# Patient Record
Sex: FEMALE | Race: WHITE | NOT HISPANIC OR LATINO
[De-identification: names, ages, dates, MRNs, and addresses within clinical notes are randomized per-mention and may not be internally consistent; named-entity substitution may affect disease eponyms.]

---

## 2020-09-23 PROBLEM — Z00.00 ENCOUNTER FOR PREVENTIVE HEALTH EXAMINATION: Status: ACTIVE | Noted: 2020-09-23

## 2020-09-24 ENCOUNTER — APPOINTMENT (OUTPATIENT)
Dept: NEUROSURGERY | Facility: CLINIC | Age: 75
End: 2020-09-24

## 2020-10-08 ENCOUNTER — APPOINTMENT (OUTPATIENT)
Dept: NEUROSURGERY | Facility: CLINIC | Age: 75
End: 2020-10-08

## 2020-10-29 ENCOUNTER — APPOINTMENT (OUTPATIENT)
Dept: NEUROSURGERY | Facility: CLINIC | Age: 75
End: 2020-10-29
Payer: MEDICARE

## 2020-10-29 VITALS
HEIGHT: 62 IN | WEIGHT: 132 LBS | DIASTOLIC BLOOD PRESSURE: 77 MMHG | BODY MASS INDEX: 24.29 KG/M2 | RESPIRATION RATE: 16 BRPM | HEART RATE: 91 BPM | TEMPERATURE: 97.6 F | SYSTOLIC BLOOD PRESSURE: 167 MMHG | OXYGEN SATURATION: 99 %

## 2020-10-29 DIAGNOSIS — Z82.49 FAMILY HISTORY OF ISCHEMIC HEART DISEASE AND OTHER DISEASES OF THE CIRCULATORY SYSTEM: ICD-10-CM

## 2020-10-29 DIAGNOSIS — Z82.3 FAMILY HISTORY OF STROKE: ICD-10-CM

## 2020-10-29 DIAGNOSIS — Z87.09 PERSONAL HISTORY OF OTHER DISEASES OF THE RESPIRATORY SYSTEM: ICD-10-CM

## 2020-10-29 DIAGNOSIS — Z87.39 PERSONAL HISTORY OF OTHER DISEASES OF THE MUSCULOSKELETAL SYSTEM AND CONNECTIVE TISSUE: ICD-10-CM

## 2020-10-29 DIAGNOSIS — I49.3 VENTRICULAR PREMATURE DEPOLARIZATION: ICD-10-CM

## 2020-10-29 DIAGNOSIS — Z87.19 PERSONAL HISTORY OF OTHER DISEASES OF THE DIGESTIVE SYSTEM: ICD-10-CM

## 2020-10-29 DIAGNOSIS — Z87.891 PERSONAL HISTORY OF NICOTINE DEPENDENCE: ICD-10-CM

## 2020-10-29 DIAGNOSIS — Z78.9 OTHER SPECIFIED HEALTH STATUS: ICD-10-CM

## 2020-10-29 DIAGNOSIS — Z86.79 PERSONAL HISTORY OF OTHER DISEASES OF THE CIRCULATORY SYSTEM: ICD-10-CM

## 2020-10-29 PROCEDURE — 99203 OFFICE O/P NEW LOW 30 MIN: CPT

## 2020-11-02 NOTE — PHYSICAL EXAM
[General Appearance - Alert] : alert [General Appearance - In No Acute Distress] : in no acute distress [General Appearance - Well Nourished] : well nourished [General Appearance - Well Developed] : well developed [General Appearance - Well-Appearing] : healthy appearing [Oriented To Time, Place, And Person] : oriented to person, place, and time [Impaired Insight] : insight and judgment were intact [Affect] : the affect was normal [Memory Recent] : recent memory was not impaired [Person] : oriented to person [Place] : oriented to place [Time] : oriented to time [Cranial Nerves Optic (II)] : visual acuity intact bilaterally,  pupils equal round and reactive to light [Cranial Nerves Oculomotor (III)] : extraocular motion intact [Cranial Nerves Trigeminal (V)] : facial sensation intact symmetrically [Cranial Nerves Facial (VII)] : face symmetrical [Cranial Nerves Vestibulocochlear (VIII)] : hearing was intact bilaterally [Cranial Nerves Glossopharyngeal (IX)] : tongue and palate midline [Cranial Nerves Accessory (XI - Cranial And Spinal)] : head turning and shoulder shrug symmetric [Cranial Nerves Hypoglossal (XII)] : there was no tongue deviation with protrusion [Motor Handedness Right-Handed] : the patient is right hand dominant [Sclera] : the sclera and conjunctiva were normal [PERRL With Normal Accommodation] : pupils were equal in size, round, reactive to light, with normal accommodation [Extraocular Movements] : extraocular movements were intact [Outer Ear] : the ears and nose were normal in appearance [Hearing Threshold Finger Rub Not [place]] : hearing was normal [Oropharynx] : the oropharynx was normal [Neck Appearance] : the appearance of the neck was normal [Respiration, Rhythm And Depth] : normal respiratory rhythm and effort [Exaggerated Use Of Accessory Muscles For Inspiration] : no accessory muscle use [Heart Rate And Rhythm] : heart rate was normal and rhythm regular [Abnormal Walk] : normal gait [Involuntary Movements] : no involuntary movements were seen [Motor Tone] : muscle strength and tone were normal [Skin Color & Pigmentation] : normal skin color and pigmentation [] : no rash [FreeTextEntry8] : slow steady gait [FreeTextEntry1] : wears glasses

## 2020-11-02 NOTE — PHYSICAL EXAM
[General Appearance - Alert] : alert [General Appearance - In No Acute Distress] : in no acute distress [General Appearance - Well Nourished] : well nourished [General Appearance - Well Developed] : well developed [General Appearance - Well-Appearing] : healthy appearing [Oriented To Time, Place, And Person] : oriented to person, place, and time [Impaired Insight] : insight and judgment were intact [Affect] : the affect was normal [Memory Recent] : recent memory was not impaired [Person] : oriented to person [Place] : oriented to place [Time] : oriented to time [Cranial Nerves Optic (II)] : visual acuity intact bilaterally,  pupils equal round and reactive to light [Cranial Nerves Oculomotor (III)] : extraocular motion intact [Cranial Nerves Trigeminal (V)] : facial sensation intact symmetrically [Cranial Nerves Facial (VII)] : face symmetrical [Cranial Nerves Vestibulocochlear (VIII)] : hearing was intact bilaterally [Cranial Nerves Glossopharyngeal (IX)] : tongue and palate midline [Cranial Nerves Accessory (XI - Cranial And Spinal)] : head turning and shoulder shrug symmetric [Cranial Nerves Hypoglossal (XII)] : there was no tongue deviation with protrusion [Motor Handedness Right-Handed] : the patient is right hand dominant [Sclera] : the sclera and conjunctiva were normal [PERRL With Normal Accommodation] : pupils were equal in size, round, reactive to light, with normal accommodation [Extraocular Movements] : extraocular movements were intact [Outer Ear] : the ears and nose were normal in appearance [Hearing Threshold Finger Rub Not [place]] : hearing was normal [Oropharynx] : the oropharynx was normal [Neck Appearance] : the appearance of the neck was normal [Respiration, Rhythm And Depth] : normal respiratory rhythm and effort [Exaggerated Use Of Accessory Muscles For Inspiration] : no accessory muscle use [Heart Rate And Rhythm] : heart rate was normal and rhythm regular [Abnormal Walk] : normal gait [Involuntary Movements] : no involuntary movements were seen [Motor Tone] : muscle strength and tone were normal [Skin Color & Pigmentation] : normal skin color and pigmentation [] : no rash [FreeTextEntry8] : slow steady gait [FreeTextEntry1] : wears glasses

## 2020-11-02 NOTE — HISTORY OF PRESENT ILLNESS
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  She also thinks that now she has a shuffling gait and this is new.\par \par She is a former smoker - quit 27 years ago.  \par \par Dr. Christianne Tyson ([facility]), [address] [place] [phone]. \par .

## 2020-11-02 NOTE — HISTORY OF PRESENT ILLNESS
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  She also thinks that now she has a shuffling gait and this is new.\par \par She is a former smoker - quit 27 years ago.  \par \par Dr. Christianne Tyson ([facility]), [address] [place] [phone]. \par .

## 2020-11-02 NOTE — REVIEW OF SYSTEMS
[Poor Coordination] : poor coordination [Dizziness] : dizziness [Difficulty Walking] : difficulty walking [Constipation] : constipation [Negative] : Endocrine [FreeTextEntry3] : wears glasses [FreeTextEntry7] : nausea [FreeTextEntry9] : arthritis

## 2020-11-13 ENCOUNTER — TRANSCRIPTION ENCOUNTER (OUTPATIENT)
Age: 75
End: 2020-11-13

## 2022-10-18 ENCOUNTER — OUTPATIENT (OUTPATIENT)
Dept: OUTPATIENT SERVICES | Facility: HOSPITAL | Age: 77
LOS: 1 days | End: 2022-10-18
Payer: MEDICARE

## 2022-10-18 ENCOUNTER — APPOINTMENT (OUTPATIENT)
Dept: MRI IMAGING | Facility: CLINIC | Age: 77
End: 2022-10-18

## 2022-10-18 DIAGNOSIS — I67.1 CEREBRAL ANEURYSM, NONRUPTURED: ICD-10-CM

## 2022-10-18 PROCEDURE — 70546 MR ANGIOGRAPH HEAD W/O&W/DYE: CPT | Mod: 26,MH

## 2022-10-18 PROCEDURE — A9585: CPT

## 2022-10-18 PROCEDURE — 70546 MR ANGIOGRAPH HEAD W/O&W/DYE: CPT

## 2022-11-07 ENCOUNTER — NON-APPOINTMENT (OUTPATIENT)
Age: 77
End: 2022-11-07

## 2022-11-15 ENCOUNTER — APPOINTMENT (OUTPATIENT)
Dept: NEUROSURGERY | Facility: CLINIC | Age: 77
End: 2022-11-15

## 2022-11-15 VITALS
SYSTOLIC BLOOD PRESSURE: 148 MMHG | WEIGHT: 126 LBS | DIASTOLIC BLOOD PRESSURE: 83 MMHG | OXYGEN SATURATION: 97 % | HEART RATE: 74 BPM | HEIGHT: 62 IN | BODY MASS INDEX: 23.19 KG/M2

## 2022-11-15 DIAGNOSIS — I67.1 CEREBRAL ANEURYSM, NONRUPTURED: ICD-10-CM

## 2022-11-15 PROCEDURE — 99213 OFFICE O/P EST LOW 20 MIN: CPT

## 2022-11-15 RX ORDER — MULTIVITAMIN
TABLET ORAL
Refills: 0 | Status: ACTIVE | COMMUNITY

## 2022-11-15 RX ORDER — FLUTICASONE FUROATE, UMECLIDINIUM BROMIDE AND VILANTEROL TRIFENATATE 100; 62.5; 25 UG/1; UG/1; UG/1
100-62.5-25 POWDER RESPIRATORY (INHALATION)
Qty: 60 | Refills: 0 | Status: ACTIVE | COMMUNITY
Start: 2022-06-11

## 2022-11-15 RX ORDER — LORATADINE 10 MG/1
10 TABLET ORAL
Refills: 0 | Status: ACTIVE | COMMUNITY

## 2022-11-15 RX ORDER — ATORVASTATIN CALCIUM 40 MG/1
40 TABLET, FILM COATED ORAL
Qty: 30 | Refills: 0 | Status: ACTIVE | COMMUNITY
Start: 2022-07-14

## 2022-11-15 RX ORDER — CEPHALEXIN 500 MG/1
500 CAPSULE ORAL
Qty: 21 | Refills: 0 | Status: DISCONTINUED | COMMUNITY
Start: 2022-09-08

## 2022-11-15 RX ORDER — SUCRALFATE 1 G/1
1 TABLET ORAL
Qty: 360 | Refills: 0 | Status: ACTIVE | COMMUNITY
Start: 2022-11-09

## 2022-11-15 RX ORDER — CLOPIDOGREL BISULFATE 75 MG/1
75 TABLET, FILM COATED ORAL
Qty: 90 | Refills: 0 | Status: ACTIVE | COMMUNITY
Start: 2022-07-05

## 2022-11-15 RX ORDER — AZELASTINE HYDROCHLORIDE 137 UG/1
0.1 SPRAY, METERED NASAL
Qty: 30 | Refills: 0 | Status: ACTIVE | COMMUNITY
Start: 2022-05-16

## 2022-11-15 RX ORDER — DONEPEZIL HYDROCHLORIDE 10 MG/1
10 TABLET ORAL
Qty: 30 | Refills: 0 | Status: ACTIVE | COMMUNITY
Start: 2022-07-14

## 2022-11-15 RX ORDER — AMLODIPINE BESYLATE 5 MG/1
5 TABLET ORAL
Refills: 0 | Status: ACTIVE | COMMUNITY

## 2022-11-15 RX ORDER — PANTOPRAZOLE 20 MG/1
20 TABLET, DELAYED RELEASE ORAL
Qty: 90 | Refills: 0 | Status: ACTIVE | COMMUNITY
Start: 2022-07-14

## 2022-11-21 NOTE — HISTORY OF PRESENT ILLNESS
[FreeTextEntry1] : Shelley Enamorado is a pleasant 77 year old right handed female who presents today for follow up and MRA review.  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  \par \par She is a former smoker - quit 27 years ago.  \par \par She had 2 cardiac stents in 3/2022, on ASA and Plavix. \par \par Dr. Christianne Tyson ([facility]), [address] [place] [phone]. \par .

## 2022-11-21 NOTE — PHYSICAL EXAM
[General Appearance - Alert] : alert [General Appearance - In No Acute Distress] : in no acute distress [General Appearance - Well Nourished] : well nourished [General Appearance - Well Developed] : well developed [General Appearance - Well-Appearing] : healthy appearing [Oriented To Time, Place, And Person] : oriented to person, place, and time [Impaired Insight] : insight and judgment were intact [Affect] : the affect was normal [Memory Recent] : recent memory was not impaired [Person] : oriented to person [Place] : oriented to place [Time] : oriented to time [Cranial Nerves Optic (II)] : visual acuity intact bilaterally,  pupils equal round and reactive to light [Cranial Nerves Oculomotor (III)] : extraocular motion intact [Cranial Nerves Trigeminal (V)] : facial sensation intact symmetrically [Cranial Nerves Facial (VII)] : face symmetrical [Cranial Nerves Vestibulocochlear (VIII)] : hearing was intact bilaterally [Cranial Nerves Glossopharyngeal (IX)] : tongue and palate midline [Cranial Nerves Accessory (XI - Cranial And Spinal)] : head turning and shoulder shrug symmetric [Cranial Nerves Hypoglossal (XII)] : there was no tongue deviation with protrusion [Motor Handedness Right-Handed] : the patient is right hand dominant [Sclera] : the sclera and conjunctiva were normal [PERRL With Normal Accommodation] : pupils were equal in size, round, reactive to light, with normal accommodation [Extraocular Movements] : extraocular movements were intact [Outer Ear] : the ears and nose were normal in appearance [Hearing Threshold Finger Rub Not [place]] : hearing was normal [Oropharynx] : the oropharynx was normal [Neck Appearance] : the appearance of the neck was normal [Respiration, Rhythm And Depth] : normal respiratory rhythm and effort [Exaggerated Use Of Accessory Muscles For Inspiration] : no accessory muscle use [Heart Rate And Rhythm] : heart rate was normal and rhythm regular [Abnormal Walk] : normal gait [Involuntary Movements] : no involuntary movements were seen [Motor Tone] : muscle strength and tone were normal [Skin Color & Pigmentation] : normal skin color and pigmentation [] : no rash [FreeTextEntry8] : slow steady gait [FreeTextEntry1] : wears glasses

## 2022-11-21 NOTE — ASSESSMENT
[FreeTextEntry1] : IMPRESSION:\par s/p coil embolization of left ICA aneurysm in 2/2010\par Feeling Well Today Without Complaints\par \par MRA Head 10/2022 reveals complete occlusion of the previously treated left ICA aneurysm; unchanged from prior in 2020\par \par Will continue routine follow up with another MRA in 2 years. \par \par PLAN:\par MRA Head wo in 11/2024\par Follow Up with Me After Imaging\par

## 2023-01-26 ENCOUNTER — OUTPATIENT (OUTPATIENT)
Dept: OUTPATIENT SERVICES | Facility: HOSPITAL | Age: 78
LOS: 1 days | Discharge: HOME | End: 2023-01-26
Payer: MEDICARE

## 2023-01-26 ENCOUNTER — TRANSCRIPTION ENCOUNTER (OUTPATIENT)
Age: 78
End: 2023-01-26

## 2023-01-26 VITALS
HEART RATE: 74 BPM | OXYGEN SATURATION: 98 % | DIASTOLIC BLOOD PRESSURE: 64 MMHG | RESPIRATION RATE: 17 BRPM | SYSTOLIC BLOOD PRESSURE: 138 MMHG

## 2023-01-26 VITALS
DIASTOLIC BLOOD PRESSURE: 78 MMHG | HEART RATE: 80 BPM | TEMPERATURE: 98 F | SYSTOLIC BLOOD PRESSURE: 142 MMHG | WEIGHT: 126.1 LBS | HEIGHT: 62 IN | RESPIRATION RATE: 17 BRPM | OXYGEN SATURATION: 99 %

## 2023-01-26 DIAGNOSIS — Z98.890 OTHER SPECIFIED POSTPROCEDURAL STATES: Chronic | ICD-10-CM

## 2023-01-26 DIAGNOSIS — I67.1 CEREBRAL ANEURYSM, NONRUPTURED: Chronic | ICD-10-CM

## 2023-01-26 DIAGNOSIS — K21.9 GASTRO-ESOPHAGEAL REFLUX DISEASE WITHOUT ESOPHAGITIS: ICD-10-CM

## 2023-01-26 DIAGNOSIS — R11.0 NAUSEA: ICD-10-CM

## 2023-01-26 DIAGNOSIS — Z90.710 ACQUIRED ABSENCE OF BOTH CERVIX AND UTERUS: Chronic | ICD-10-CM

## 2023-01-26 PROCEDURE — 88305 TISSUE EXAM BY PATHOLOGIST: CPT | Mod: 26

## 2023-01-26 PROCEDURE — 88312 SPECIAL STAINS GROUP 1: CPT | Mod: 26

## 2023-01-26 RX ORDER — SODIUM CHLORIDE 9 MG/ML
1000 INJECTION, SOLUTION INTRAVENOUS
Refills: 0 | Status: DISCONTINUED | OUTPATIENT
Start: 2023-01-26 | End: 2023-01-26

## 2023-01-26 RX ORDER — LORATADINE 10 MG/1
2 TABLET ORAL
Qty: 0 | Refills: 0 | DISCHARGE

## 2023-01-26 RX ORDER — SUCRALFATE 1 G
1 TABLET ORAL
Qty: 0 | Refills: 0 | DISCHARGE

## 2023-01-26 RX ORDER — DONEPEZIL HYDROCHLORIDE 10 MG/1
1 TABLET, FILM COATED ORAL
Qty: 0 | Refills: 0 | DISCHARGE

## 2023-01-26 RX ORDER — CLOPIDOGREL BISULFATE 75 MG/1
1 TABLET, FILM COATED ORAL
Qty: 0 | Refills: 0 | DISCHARGE

## 2023-01-26 RX ORDER — MONTELUKAST 4 MG/1
1 TABLET, CHEWABLE ORAL
Qty: 0 | Refills: 0 | DISCHARGE

## 2023-01-26 RX ORDER — AMLODIPINE BESYLATE 2.5 MG/1
1 TABLET ORAL
Qty: 0 | Refills: 0 | DISCHARGE

## 2023-01-26 RX ORDER — AZELASTINE HYDROCHLORIDE AND FLUTICASONE PROPIONATE 137; 50 UG/1; UG/1
1 SPRAY, METERED NASAL
Qty: 0 | Refills: 0 | DISCHARGE

## 2023-01-26 RX ORDER — ATORVASTATIN CALCIUM 80 MG/1
1 TABLET, FILM COATED ORAL
Qty: 0 | Refills: 0 | DISCHARGE

## 2023-01-26 RX ORDER — FLUTICASONE FUROATE, UMECLIDINIUM BROMIDE AND VILANTEROL TRIFENATATE 200; 62.5; 25 UG/1; UG/1; UG/1
1 POWDER RESPIRATORY (INHALATION)
Qty: 0 | Refills: 0 | DISCHARGE

## 2023-01-26 RX ORDER — PANTOPRAZOLE SODIUM 20 MG/1
1 TABLET, DELAYED RELEASE ORAL
Qty: 0 | Refills: 0 | DISCHARGE

## 2023-01-26 RX ADMIN — SODIUM CHLORIDE 75 MILLILITER(S): 9 INJECTION, SOLUTION INTRAVENOUS at 10:15

## 2023-01-26 NOTE — ASU PATIENT PROFILE, ADULT - FALL HARM RISK - UNIVERSAL INTERVENTIONS
Bed in lowest position, wheels locked, appropriate side rails in place/Call bell, personal items and telephone in reach/Instruct patient to call for assistance before getting out of bed or chair/Non-slip footwear when patient is out of bed/[place] to call system/Physically safe environment - no spills, clutter or unnecessary equipment/Purposeful Proactive Rounding/Room/bathroom lighting operational, light cord in reach

## 2023-01-26 NOTE — ASU DISCHARGE PLAN (ADULT/PEDIATRIC) - NURSING INSTRUCTIONS
discharged instructions and teachings done. Patient verbalizes understanding of teachings. Will f/u with Md as plan.

## 2023-01-26 NOTE — ASU DISCHARGE PLAN (ADULT/PEDIATRIC) - NS MD DC FALL RISK RISK
For information on Fall & Injury Prevention, visit: [email or web address] OR  [email or web address] OR  https://www.cdc.gov/steadi/patient.html

## 2023-01-26 NOTE — ASU PATIENT PROFILE, ADULT - FALL HARM RISK - PT AGE POPULATION HIDDEN
Location of patient: [address] call from Mickeal Drought at [facility] with [employer]. Subjective: Caller states \"abdominal pain, seen at urgent care one week ago for eye issues and was given antibiotics which has improved the eye \"     Current Symptoms: abdominal pain x2 days, cramping sensation all over stomach. Constipation, last BM today but had to use Senna to help. Reports BM was normal    Denies diarrhea, fevers, vomiting, nausea. Onset: 2 days ago; sudden    Associated Symptoms: NA    Pain Severity: 8/10; cramping; constant    Temperature: Denies     What has been tried: senna for constipation    LMP: NA Pregnant: NA    Recommended disposition: Go to ED/UCC Now (Or to Office with PCP Approval), patient requests office appt. 2nd level triage completed with Dr. Nav Hilton states to schedule patient. Have patient take senna and increase hydration. ; provider recommends patient be seen in office     Care advice provided, patient verbalizes understanding; denies any other questions or concerns; instructed to call back for any new or worsening symptoms. Patient/Caller agrees with recommended disposition; writer provided warm transfer to Fran Cantu at [facility] for appointment scheduling    Attention Provider: Thank you for allowing me to participate in the care of your patient. The patient was connected to triage in response to information provided to the ECC/PSC. Please do not respond through this encounter as the response is not directed to a shared pool.   Reason for Disposition   Constant abdominal pain lasting > 2 hours    Protocols used: Abdominal Pain - Female-ADULT-[place] Adult

## 2023-01-26 NOTE — ASU PATIENT PROFILE, ADULT - NSICDXPASTSURGICALHX_GEN_ALL_CORE_FT
PAST SURGICAL HISTORY:  Brain aneurysm embolization    H/O coronary angiogram 2 stents    H/O: hysterectomy     History of lumbar surgery     S/P LASIK surgery of both eyes

## 2023-01-27 LAB — SURGICAL PATHOLOGY STUDY: SIGNIFICANT CHANGE UP

## 2023-01-31 DIAGNOSIS — K44.9 DIAPHRAGMATIC HERNIA WITHOUT OBSTRUCTION OR GANGRENE: ICD-10-CM

## 2023-02-02 DIAGNOSIS — K29.50 UNSPECIFIED CHRONIC GASTRITIS WITHOUT BLEEDING: ICD-10-CM

## 2023-02-02 DIAGNOSIS — K31.7 POLYP OF STOMACH AND DUODENUM: ICD-10-CM

## 2023-02-02 DIAGNOSIS — K21.00 GASTRO-ESOPHAGEAL REFLUX DISEASE WITH ESOPHAGITIS, WITHOUT BLEEDING: ICD-10-CM

## 2023-11-20 PROBLEM — Q43.8 OTHER SPECIFIED CONGENITAL MALFORMATIONS OF INTESTINE: Chronic | Status: ACTIVE | Noted: 2023-01-26

## 2023-11-20 PROBLEM — I49.3 VENTRICULAR PREMATURE DEPOLARIZATION: Chronic | Status: ACTIVE | Noted: 2023-01-26

## 2023-11-20 PROBLEM — I10 ESSENTIAL (PRIMARY) HYPERTENSION: Chronic | Status: ACTIVE | Noted: 2023-01-26

## 2023-11-20 PROBLEM — K21.9 GASTRO-ESOPHAGEAL REFLUX DISEASE WITHOUT ESOPHAGITIS: Chronic | Status: ACTIVE | Noted: 2023-01-26

## 2023-11-20 PROBLEM — Z87.19 PERSONAL HISTORY OF OTHER DISEASES OF THE DIGESTIVE SYSTEM: Chronic | Status: ACTIVE | Noted: 2023-01-26

## 2023-11-20 PROBLEM — J44.9 CHRONIC OBSTRUCTIVE PULMONARY DISEASE, UNSPECIFIED: Chronic | Status: ACTIVE | Noted: 2023-01-26

## 2023-11-22 ENCOUNTER — APPOINTMENT (OUTPATIENT)
Dept: ORTHOPEDIC SURGERY | Facility: CLINIC | Age: 78
End: 2023-11-22
Payer: MEDICARE

## 2023-11-22 VITALS — WEIGHT: 126 LBS | HEIGHT: 62 IN | BODY MASS INDEX: 23.19 KG/M2

## 2023-11-22 DIAGNOSIS — M19.012 PRIMARY OSTEOARTHRITIS, LEFT SHOULDER: ICD-10-CM

## 2023-11-22 DIAGNOSIS — M75.42 IMPINGEMENT SYNDROME OF LEFT SHOULDER: ICD-10-CM

## 2023-11-22 PROCEDURE — 73030 X-RAY EXAM OF SHOULDER: CPT | Mod: LT

## 2023-11-22 PROCEDURE — J3490M: CUSTOM | Mod: NC

## 2023-11-22 PROCEDURE — 99204 OFFICE O/P NEW MOD 45 MIN: CPT | Mod: 25

## 2023-11-22 PROCEDURE — 20611 DRAIN/INJ JOINT/BURSA W/US: CPT | Mod: LT

## 2023-11-22 PROCEDURE — 73010 X-RAY EXAM OF SHOULDER BLADE: CPT | Mod: LT

## 2024-06-30 ENCOUNTER — NON-APPOINTMENT (OUTPATIENT)
Age: 79
End: 2024-06-30

## 2024-10-18 ENCOUNTER — APPOINTMENT (OUTPATIENT)
Dept: PULMONOLOGY | Facility: CLINIC | Age: 79
End: 2024-10-18

## 2024-11-04 ENCOUNTER — INPATIENT (INPATIENT)
Facility: HOSPITAL | Age: 79
LOS: 7 days | Discharge: NURSING, REHAB OR LONG-TERM CARE | DRG: 204 | End: 2024-11-12
Attending: INTERNAL MEDICINE | Admitting: INTERNAL MEDICINE
Payer: MEDICARE

## 2024-11-04 VITALS
HEIGHT: 61 IN | WEIGHT: 121.92 LBS | SYSTOLIC BLOOD PRESSURE: 113 MMHG | RESPIRATION RATE: 23 BRPM | TEMPERATURE: 95 F | HEART RATE: 84 BPM | OXYGEN SATURATION: 98 % | DIASTOLIC BLOOD PRESSURE: 75 MMHG

## 2024-11-04 DIAGNOSIS — Z98.890 OTHER SPECIFIED POSTPROCEDURAL STATES: Chronic | ICD-10-CM

## 2024-11-04 DIAGNOSIS — Z90.710 ACQUIRED ABSENCE OF BOTH CERVIX AND UTERUS: Chronic | ICD-10-CM

## 2024-11-04 DIAGNOSIS — I67.1 CEREBRAL ANEURYSM, NONRUPTURED: Chronic | ICD-10-CM

## 2024-11-04 DIAGNOSIS — R06.09 OTHER FORMS OF DYSPNEA: ICD-10-CM

## 2024-11-04 LAB
ALBUMIN SERPL ELPH-MCNC: 4.1 G/DL — SIGNIFICANT CHANGE UP (ref 3.3–5)
ALP SERPL-CCNC: 50 U/L — SIGNIFICANT CHANGE UP (ref 40–120)
ALT FLD-CCNC: 19 U/L — SIGNIFICANT CHANGE UP (ref 10–45)
ANION GAP SERPL CALC-SCNC: 13 MMOL/L — SIGNIFICANT CHANGE UP (ref 5–17)
APPEARANCE UR: ABNORMAL
AST SERPL-CCNC: 17 U/L — SIGNIFICANT CHANGE UP (ref 10–40)
BACTERIA # UR AUTO: ABNORMAL /HPF
BASOPHILS # BLD AUTO: 0.02 K/UL — SIGNIFICANT CHANGE UP (ref 0–0.2)
BASOPHILS NFR BLD AUTO: 0.2 % — SIGNIFICANT CHANGE UP (ref 0–2)
BILIRUB SERPL-MCNC: 0.3 MG/DL — SIGNIFICANT CHANGE UP (ref 0.2–1.2)
BILIRUB UR-MCNC: NEGATIVE — SIGNIFICANT CHANGE UP
BUN SERPL-MCNC: 33 MG/DL — HIGH (ref 7–23)
CALCIUM SERPL-MCNC: 9.6 MG/DL — SIGNIFICANT CHANGE UP (ref 8.4–10.5)
CAST: 0 /LPF — SIGNIFICANT CHANGE UP (ref 0–4)
CHLORIDE SERPL-SCNC: 96 MMOL/L — SIGNIFICANT CHANGE UP (ref 96–108)
CO2 SERPL-SCNC: 23 MMOL/L — SIGNIFICANT CHANGE UP (ref 22–31)
COLOR SPEC: YELLOW — SIGNIFICANT CHANGE UP
CREAT SERPL-MCNC: 1.09 MG/DL — SIGNIFICANT CHANGE UP (ref 0.5–1.3)
DIFF PNL FLD: ABNORMAL
EGFR: 51 ML/MIN/1.73M2 — LOW
EOSINOPHIL # BLD AUTO: 0.03 K/UL — SIGNIFICANT CHANGE UP (ref 0–0.5)
EOSINOPHIL NFR BLD AUTO: 0.2 % — SIGNIFICANT CHANGE UP (ref 0–6)
GAS PNL BLDV: SIGNIFICANT CHANGE UP
GLUCOSE SERPL-MCNC: 97 MG/DL — SIGNIFICANT CHANGE UP (ref 70–99)
GLUCOSE UR QL: NEGATIVE MG/DL — SIGNIFICANT CHANGE UP
HCT VFR BLD CALC: 34.7 % — SIGNIFICANT CHANGE UP (ref 34.5–45)
HGB BLD-MCNC: 11.8 G/DL — SIGNIFICANT CHANGE UP (ref 11.5–15.5)
IMM GRANULOCYTES NFR BLD AUTO: 0.8 % — SIGNIFICANT CHANGE UP (ref 0–0.9)
KETONES UR-MCNC: ABNORMAL MG/DL
LEUKOCYTE ESTERASE UR-ACNC: ABNORMAL
LIDOCAIN IGE QN: 51 U/L — SIGNIFICANT CHANGE UP (ref 7–60)
LYMPHOCYTES # BLD AUTO: 2.71 K/UL — SIGNIFICANT CHANGE UP (ref 1–3.3)
LYMPHOCYTES # BLD AUTO: 22.5 % — SIGNIFICANT CHANGE UP (ref 13–44)
MAGNESIUM SERPL-MCNC: 2.2 MG/DL — SIGNIFICANT CHANGE UP (ref 1.6–2.6)
MCHC RBC-ENTMCNC: 30.3 PG — SIGNIFICANT CHANGE UP (ref 27–34)
MCHC RBC-ENTMCNC: 34 G/DL — SIGNIFICANT CHANGE UP (ref 32–36)
MCV RBC AUTO: 89 FL — SIGNIFICANT CHANGE UP (ref 80–100)
MONOCYTES # BLD AUTO: 1.03 K/UL — HIGH (ref 0–0.9)
MONOCYTES NFR BLD AUTO: 8.6 % — SIGNIFICANT CHANGE UP (ref 2–14)
NEUTROPHILS # BLD AUTO: 8.13 K/UL — HIGH (ref 1.8–7.4)
NEUTROPHILS NFR BLD AUTO: 67.7 % — SIGNIFICANT CHANGE UP (ref 43–77)
NITRITE UR-MCNC: NEGATIVE — SIGNIFICANT CHANGE UP
NRBC # BLD: 0 /100 WBCS — SIGNIFICANT CHANGE UP (ref 0–0)
NT-PROBNP SERPL-SCNC: 6326 PG/ML — HIGH (ref 0–300)
PH UR: 7.5 — SIGNIFICANT CHANGE UP (ref 5–8)
PLATELET # BLD AUTO: 328 K/UL — SIGNIFICANT CHANGE UP (ref 150–400)
POTASSIUM SERPL-MCNC: 3.4 MMOL/L — LOW (ref 3.5–5.3)
POTASSIUM SERPL-SCNC: 3.4 MMOL/L — LOW (ref 3.5–5.3)
PROT SERPL-MCNC: 6.6 G/DL — SIGNIFICANT CHANGE UP (ref 6–8.3)
PROT UR-MCNC: 30 MG/DL
RBC # BLD: 3.9 M/UL — SIGNIFICANT CHANGE UP (ref 3.8–5.2)
RBC # FLD: 17.5 % — HIGH (ref 10.3–14.5)
RBC CASTS # UR COMP ASSIST: 19 /HPF — HIGH (ref 0–4)
SODIUM SERPL-SCNC: 132 MMOL/L — LOW (ref 135–145)
SP GR SPEC: 1.02 — SIGNIFICANT CHANGE UP (ref 1–1.03)
SQUAMOUS # UR AUTO: 2 /HPF — SIGNIFICANT CHANGE UP (ref 0–5)
TROPONIN T, HIGH SENSITIVITY RESULT: 35 NG/L — SIGNIFICANT CHANGE UP (ref 0–51)
UROBILINOGEN FLD QL: 0.2 MG/DL — SIGNIFICANT CHANGE UP (ref 0.2–1)
WBC # BLD: 12.02 K/UL — HIGH (ref 3.8–10.5)
WBC # FLD AUTO: 12.02 K/UL — HIGH (ref 3.8–10.5)
WBC UR QL: 13 /HPF — HIGH (ref 0–5)

## 2024-11-04 PROCEDURE — 99285 EMERGENCY DEPT VISIT HI MDM: CPT | Mod: GC

## 2024-11-04 PROCEDURE — 71045 X-RAY EXAM CHEST 1 VIEW: CPT | Mod: 26

## 2024-11-04 PROCEDURE — 93308 TTE F-UP OR LMTD: CPT | Mod: 26

## 2024-11-04 RX ORDER — SUCRALFATE 1 G/10ML
1 SUSPENSION ORAL
Refills: 0 | DISCHARGE

## 2024-11-04 RX ORDER — PANTOPRAZOLE SODIUM 40 MG/1
1 TABLET, DELAYED RELEASE ORAL
Refills: 0 | DISCHARGE

## 2024-11-04 RX ORDER — AMLODIPINE BESYLATE 10 MG
5 TABLET ORAL DAILY
Refills: 0 | Status: DISCONTINUED | OUTPATIENT
Start: 2024-11-04 | End: 2024-11-05

## 2024-11-04 RX ORDER — IPRATROPIUM BROMIDE AND ALBUTEROL SULFATE .5; 2.5 MG/3ML; MG/3ML
3 SOLUTION RESPIRATORY (INHALATION) EVERY 6 HOURS
Refills: 0 | Status: DISCONTINUED | OUTPATIENT
Start: 2024-11-04 | End: 2024-11-11

## 2024-11-04 RX ORDER — ACETAMINOPHEN 500 MG
1000 TABLET ORAL ONCE
Refills: 0 | Status: COMPLETED | OUTPATIENT
Start: 2024-11-04 | End: 2024-11-04

## 2024-11-04 RX ORDER — CEFTRIAXONE SODIUM 10 G
1000 VIAL (EA) INJECTION ONCE
Refills: 0 | Status: COMPLETED | OUTPATIENT
Start: 2024-11-04 | End: 2024-11-04

## 2024-11-04 RX ORDER — ESCITALOPRAM 10 MG/1
1 TABLET, FILM COATED ORAL
Refills: 0 | DISCHARGE

## 2024-11-04 RX ORDER — MONTELUKAST SODIUM 10 MG/1
1 TABLET, FILM COATED ORAL
Refills: 0 | DISCHARGE

## 2024-11-04 RX ORDER — MONTELUKAST SODIUM 10 MG/1
10 TABLET, FILM COATED ORAL DAILY
Refills: 0 | Status: DISCONTINUED | OUTPATIENT
Start: 2024-11-04 | End: 2024-11-12

## 2024-11-04 RX ORDER — POTASSIUM CHLORIDE 10 MEQ
40 TABLET, EXTENDED RELEASE ORAL ONCE
Refills: 0 | Status: COMPLETED | OUTPATIENT
Start: 2024-11-04 | End: 2024-11-04

## 2024-11-04 RX ORDER — CLOPIDOGREL 75 MG/1
75 TABLET ORAL DAILY
Refills: 0 | Status: DISCONTINUED | OUTPATIENT
Start: 2024-11-04 | End: 2024-11-12

## 2024-11-04 RX ORDER — DONEPEZIL HYDROCHLORIDE 23 MG/1
1 TABLET ORAL
Refills: 0 | DISCHARGE

## 2024-11-04 RX ORDER — ROFLUMILAST 500 UG/1
1 TABLET ORAL
Refills: 0 | DISCHARGE

## 2024-11-04 RX ORDER — DONEPEZIL HYDROCHLORIDE 23 MG/1
10 TABLET ORAL AT BEDTIME
Refills: 0 | Status: DISCONTINUED | OUTPATIENT
Start: 2024-11-04 | End: 2024-11-12

## 2024-11-04 RX ORDER — SUCRALFATE 1 G/10ML
1 SUSPENSION ORAL
Refills: 0 | Status: DISCONTINUED | OUTPATIENT
Start: 2024-11-04 | End: 2024-11-12

## 2024-11-04 RX ORDER — FEXOFENADINE HCL 180 MG
1 TABLET ORAL
Refills: 0 | DISCHARGE

## 2024-11-04 RX ADMIN — Medication 40 MILLIGRAM(S): at 21:28

## 2024-11-04 RX ADMIN — Medication 1000 MILLIGRAM(S): at 18:27

## 2024-11-04 RX ADMIN — DONEPEZIL HYDROCHLORIDE 10 MILLIGRAM(S): 23 TABLET ORAL at 21:28

## 2024-11-04 RX ADMIN — Medication 400 MILLIGRAM(S): at 16:14

## 2024-11-04 RX ADMIN — SUCRALFATE 1 GRAM(S): 1 SUSPENSION ORAL at 18:14

## 2024-11-04 RX ADMIN — IPRATROPIUM BROMIDE AND ALBUTEROL SULFATE 3 MILLILITER(S): .5; 2.5 SOLUTION RESPIRATORY (INHALATION) at 23:36

## 2024-11-04 RX ADMIN — Medication 40 MILLIEQUIVALENT(S): at 15:36

## 2024-11-04 RX ADMIN — SUCRALFATE 1 GRAM(S): 1 SUSPENSION ORAL at 23:36

## 2024-11-04 RX ADMIN — Medication 100 MILLIGRAM(S): at 15:36

## 2024-11-04 NOTE — PATIENT PROFILE ADULT - NSPRESCRALCSIXMORE_GEN_A_NUR
Anesthesia Evaluation     history of anesthetic complications:  PONV  NPO Solid Status: > 8 hours  NPO Liquid Status: > 8 hours           Airway   Mallampati: I  No difficulty expected  Dental      Pulmonary    Cardiovascular   Exercise tolerance: good (4-7 METS)        Neuro/Psych  (+) headaches, numbness, psychiatric history  GI/Hepatic/Renal/Endo    (+) GERD, PUD    Musculoskeletal     (+) neck pain  Abdominal    Substance History      OB/GYN          Other                      Anesthesia Plan    ASA 3     MAC     intravenous induction     Anesthetic plan, risks, benefits, and alternatives have been provided, discussed and informed consent has been obtained with: patient.    CODE STATUS:         
Never

## 2024-11-04 NOTE — H&P ADULT - ASSESSMENT
79-year-old female with past medical history of COPD, CAD status post 3 stents on aspirin, atrial fibrillation on Eliquis, GERD, presenting to the ED for evaluation of worsening dyspnea on exertion over the past several months.  Patient symptoms began 2 years ago and have progressively been worsening over the past 7 months.  Patient has seen pulmonologist as well as cardiologist with no significant improvement in her symptoms.  Patient is currently finishing a course of steroids with no improvement.  Patient reports her dyspnea on exertion exacerbates when talking, walking, eating, and is alleviated by rest.  They report the patient also has a pulse oximetry at home that is 99 to 100% at rest and drops to 86% when she is walking.  Patient is not on any home oxygen.  She denies any chest pain, cough, fever, chills, nausea, vomiting, diarrhea, abdominal pain, dysuria, hematuria, flank pain, headache, and any additional complaints at this time.  No recent travel or sick contacts.    JIMENES  - likely sec to COPD exacerbation   - cards and pulm fu   - cw duonebs  - will monitor     CAD  - cw asa  - cards fu     Afib  - cw eliquis     GERD  - cw carafate     79-year-old female with past medical history of COPD, CAD status post 3 stents on aspirin, atrial fibrillation on Eliquis, GERD, presenting to the ED for evaluation of worsening dyspnea on exertion over the past several months.  Patient symptoms began 2 years ago and have progressively been worsening over the past 7 months.  Patient has seen pulmonologist as well as cardiologist with no significant improvement in her symptoms.  Patient is currently finishing a course of steroids with no improvement.  Patient reports her dyspnea on exertion exacerbates when talking, walking, eating, and is alleviated by rest.  They report the patient also has a pulse oximetry at home that is 99 to 100% at rest and drops to 86% when she is walking.  Patient is not on any home oxygen.  She denies any chest pain, cough, fever, chills, nausea, vomiting, diarrhea, abdominal pain, dysuria, hematuria, flank pain, headache, and any additional complaints at this time.  No recent travel or sick contacts.    JIMENES  - likely sec to COPD exacerbation   - cards and pulm fu   - cw duonebs  - will monitor     CAD  - cw asa  - cards fu     Afib  - holding  eliquis for subconjunctival hemorrhage     GERD  - cw carafate

## 2024-11-04 NOTE — ED ADULT NURSE NOTE - NSICDXPASTMEDICALHX_GEN_ALL_CORE_FT
PAST MEDICAL HISTORY:  Acid reflux     COPD, mild     History of IBS     Hypertension     Premature ventricular beat     Redundant colon

## 2024-11-04 NOTE — PATIENT PROFILE ADULT - NSPRESCRALCAMT_GEN_A_NUR
Patient attends pre-op testing today following consult c Dr. Hall due to chronic pain to right hip. Elective R MARSHA is now scheduled in this facility for 9/21/2020.
1 or 2

## 2024-11-04 NOTE — H&P ADULT - HISTORY OF PRESENT ILLNESS
79-year-old female with past medical history of COPD, CAD status post 3 stents on aspirin, atrial fibrillation on Eliquis, GERD, presenting to the ED for evaluation of worsening dyspnea on exertion over the past several months.  Patient symptoms began 2 years ago and have progressively been worsening over the past 7 months.  Patient has seen pulmonologist as well as cardiologist with no significant improvement in her symptoms.  Patient is currently finishing a course of steroids with no improvement.  Patient reports her dyspnea on exertion exacerbates when talking, walking, eating, and is alleviated by rest.  They report the patient also has a pulse oximetry at home that is 99 to 100% at rest and drops to 86% when she is walking.  Patient is not on any home oxygen.  She denies any chest pain, cough, fever, chills, nausea, vomiting, diarrhea, abdominal pain, dysuria, hematuria, flank pain, headache, and any additional complaints at this time.  No recent travel or sick contacts.

## 2024-11-04 NOTE — ED ADULT NURSE NOTE - OBJECTIVE STATEMENT
80 y/o female came to the ED with complaints of SOB x 7 months, dyspnea on exertion worsening. Symptoms originally started 2 years ago. COPD former daily smoker. Has seen pulmonary and cardiology without improvement. Denies CP, fevers, chills, cough, headache, dizziness, N, V.

## 2024-11-04 NOTE — ED PROVIDER NOTE - NSICDXPASTMEDICALHX_GEN_ALL_CORE_FT
"  History     Chief Complaint   Patient presents with     Generalized Weakness     welfare check by [facility], unable to care for self at home, not eating or drinking, no pain, no CP, no SOA,     HPI    Wade Acevedo is a 57 year old male who presents to the ED with his uncle for concerns of generalized weakness. The patient had a welfare check completed by [facility] earlier today and it was felt that he is unable to care for himself. Per nurse's note, there were about 20 empty bottles of gin found in the patient's home. The patient states he stopped drinking 1.5 months ago and has not had any alcohol since then. He reports since the end of 12/2016 he has felt dizzy and weak whenever he tries to stand up. The dizziness is described as feeling like he is going to fall down. He feels fine while laying or sitting. He notes two weeks ago he did fall and landed on his right side. Since that fall he has had pain in his right chest wall, but it is not painful now. He has been treating his pain with ibuprofen but adds he has taken ibuprofen daily for about 20 years. He usually takes 2-3 tablets of ibuprofen once per day, but admits sometimes he takes 4-5 tablets. The patient's uncle reports the patient has had very little appetite and is thirsty all the time. The patient also states he has been constipated.  In fact he had had no bowel movements for several days until yesterday.  Yesterday his stool was \"brown and gooey\" and he had an episode of stool incontinence yesterday. No black or tarry stools. The patient denies fevers, chills, sweats, sore throat, cough, shortness of breath, chest pain, nausea, vomiting, or abdominal pain. The patient does not have a PCP and cannot recall that last time he was seen by any medical provider. He has never been hospitalized and has no prior surgeries. He denies any prior diagnosis of esophageal varices, ulcers, or cirrhosis, but says \"I probably have cirrhosis.\" He has never been " diagnosed with diabetes, heart disease, or asthma, although he has never been tested for these. He is a current smoker, 1 ppd. He denies illicit drug use.    Past Medical History   No past medical history on file.    Problem List  There is no problem list on file for this patient.      Past Surgical History  No past surgical history on file.    Social History  Social History     Social History     Marital status: Single     Spouse name: N/A     Number of children: N/A     Years of education: N/A     Occupational History     Not on file.     Social History Main Topics     Smoking status: Not on file     Smokeless tobacco: Not on file     Alcohol use Not on file     Drug use: Not on file     Sexual activity: Not on file     Other Topics Concern     Not on file     Social History Narrative       I have reviewed the Medications, Allergies, Past Medical and Surgical History, and Social History in the Epic system.    Review of Systems  All other systems are reviewed and are negative    Physical Exam   BP: (!) 84/53  Pulse: 103  Heart Rate: 104  Temp: 98.7  F (37.1  C)  Resp: 24  SpO2: 97 %  Physical Exam  Nursing note and vitals were reviewed.  Constitutional: Awake and alert, chronically ill appearing, poorly kept but awake and alert and cognitively intact 57-year-old, who answers questions appropriately and cooperates with examination.  HEENT: Atraumatic and normocephalic.  EACs clear.  TMs normal.  PERRLA.  EOMI. Oropharynx poor dentition and dry mucous membranes.  Neck: Freely mobile.  No pain with range of motion.  No cervical adenopathy.  Cardiovascular: Cardiac examination reveals tachycardic heart rate and regular rhythm without murmur.  Pulmonary/Chest: Breathing is unlabored.  Breath sounds are clear and equal bilaterally.  There no retractions, tachypnea, rales, wheezes, or rhonchi.  Abdomen: Soft, nontender, no HSM or masses rebound or guarding.  Rectal exam reveals a moderate amount of brown guaiac-negative  stool  Musculoskeletal: Extremities are warm and well-perfused and without edema  Neurological: Alert, oriented, thought content logical, coherent   Skin: Warm, dry, no rashes.  Psychiatric: Affect broad and appropriate.        ED Course     ED Course     Procedures             EKG Interpretation:      Interpreted by Blayne Guzman  Time reviewed: 12:54  Symptoms at time of EKG: hypotension   Rhythm: sinus tachycardia  Rate: 101  Axis: normal  Ectopy: none  Conduction: normal  ST Segments/ T Waves: Inferior T-wave flattening is present.  Q Waves: none  Comparison to prior: No old EKG available    Clinical Impression: abnormal, nondiagnostic EKG          Critical Care time:  was 45 minutes for this patient excluding procedures.     Lactate is greater than 2 due to hypotension, at this time there is no sign of sepsis.         Labs Ordered and Resulted from Time of ED Arrival Up to the Time of Departure from the ED   CBC WITH PLATELETS DIFFERENTIAL - Abnormal; Notable for the following:        Result Value    RBC Count 3.05 (*)     Hemoglobin 6.6 (*)     Hematocrit 19.9 (*)     MCV 65 (*)     MCH 21.6 (*)     RDW 15.9 (*)     All other components within normal limits   COMPREHENSIVE METABOLIC PANEL - Abnormal; Notable for the following:     Potassium 2.8 (*)     Glucose 123 (*)     Creatinine 1.81 (*)     GFR Estimate 39 (*)     GFR Estimate If Black 47 (*)     Albumin 3.2 (*)     Alkaline Phosphatase 244 (*)      (*)     All other components within normal limits   LACTIC ACID WHOLE BLOOD - Abnormal; Notable for the following:     Lactic Acid 2.4 (*)     All other components within normal limits   ROUTINE UA WITH MICROSCOPIC - Abnormal; Notable for the following:     Specific Gravity Urine 1.002 (*)     All other components within normal limits   IRON AND IRON BINDING CAPACITY - Abnormal; Notable for the following:     Iron Binding Cap 137 (*)     Iron Saturation Index 59 (*)     All other components within  normal limits   RETICULOCYTE COUNT - Abnormal; Notable for the following:     Absolute Retic 17.6 (*)     All other components within normal limits   INR   TSH WITH FREE T4 REFLEX   TROPONIN I   BLOOD MORPHOLOGY PATHOLOGIST REVIEW   VITAMIN B12   FOLATE   LACTIC ACID WHOLE BLOOD   ALCOHOL ETHYL   PULSE OXIMETRY NURSING   CARDIAC CONTINUOUS MONITORING   MEASURE URINE OUTPUT   PATIENT CARE ORDER   ABO/RH TYPE AND SCREEN   RED BLOOD CELL PREPARE ORDER UNIT   RED BLOOD CELL HAVE AVAILABLE   URINE CULTURE AEROBIC BACTERIAL   BLOOD CULTURE   BLOOD CULTURE     Results for orders placed or performed during the hospital encounter of 04/20/17 (from the past 24 hour(s))   Occult blood stool POCT   Result Value Ref Range    Occult Blood neg neg    Internal QC OK Yes     Test Card Lot Number [id number]    CBC with platelets differential   Result Value Ref Range    WBC 7.6 4.0 - 11.0 10e9/L    RBC Count 3.05 (L) 4.4 - 5.9 10e12/L    Hemoglobin 6.6 (LL) 13.3 - 17.7 g/dL    Hematocrit 19.9 (L) 40.0 - 53.0 %    MCV 65 (L) 78 - 100 fl    MCH 21.6 (L) 26.5 - 33.0 pg    MCHC 33.2 31.5 - 36.5 g/dL    RDW 15.9 (H) 10.0 - 15.0 %    Platelet Count 236 150 - 450 10e9/L    Diff Method Automated Method     % Neutrophils 70.0 %    % Lymphocytes 20.9 %    % Monocytes 7.7 %    % Eosinophils 0.8 %    % Basophils 0.3 %    % Immature Granulocytes 0.3 %    Absolute Neutrophil 5.3 1.6 - 8.3 10e9/L    Absolute Lymphocytes 1.6 0.8 - 5.3 10e9/L    Absolute Monocytes 0.6 0.0 - 1.3 10e9/L    Absolute Eosinophils 0.1 0.0 - 0.7 10e9/L    Absolute Basophils 0.0 0.0 - 0.2 10e9/L    Abs Immature Granulocytes 0.0 0 - 0.4 10e9/L   Comprehensive metabolic panel   Result Value Ref Range    Sodium 140 133 - 144 mmol/L    Potassium 2.8 (L) 3.4 - 5.3 mmol/L    Chloride 108 94 - 109 mmol/L    Carbon Dioxide 21 20 - 32 mmol/L    Anion Gap 11 3 - 14 mmol/L    Glucose 123 (H) 70 - 99 mg/dL    Urea Nitrogen 14 7 - 30 mg/dL    Creatinine 1.81 (H) 0.66 - 1.25 mg/dL    GFR  Estimate 39 (L) >60 mL/min/1.7m2    GFR Estimate If Black 47 (L) >60 mL/min/1.7m2    Calcium 9.5 8.5 - 10.1 mg/dL    Bilirubin Total 1.0 0.2 - 1.3 mg/dL    Albumin 3.2 (L) 3.4 - 5.0 g/dL    Protein Total 7.7 6.8 - 8.8 g/dL    Alkaline Phosphatase 244 (H) 40 - 150 U/L    ALT 28 0 - 70 U/L     (H) 0 - 45 U/L   Lactic acid whole blood   Result Value Ref Range    Lactic Acid 2.4 (H) 0.7 - 2.1 mmol/L   INR   Result Value Ref Range    INR 1.05 0.86 - 1.14   TSH with free T4 reflex   Result Value Ref Range    TSH 0.66 0.40 - 4.00 mU/L   Troponin I   Result Value Ref Range    Troponin I ES  0.000 - 0.045 ug/L     <0.015  The 99th percentile for upper reference range is 0.045 ug/L.  Troponin values in   the range of 0.045 - 0.120 ug/L may be associated with risks of adverse   clinical events.     Iron and iron binding capacity   Result Value Ref Range    Iron 81 35 - 180 ug/dL    Iron Binding Cap 137 (L) 240 - 430 ug/dL    Iron Saturation Index 59 (H) 15 - 46 %   Reticulocyte count   Result Value Ref Range    % Retic 0.6 0.5 - 2.0 %    Absolute Retic 17.6 (L) 25 - 95 10e9/L   Alcohol ethyl   Result Value Ref Range    Ethanol g/dL <0.01 <0.01 g/dL   CT Head w/o Contrast    [facility]    CT HEAD W/O CONTRAST   4/20/2017 2:20 PM     HISTORY: fall    TECHNIQUE: Axial images of the head without IV contrast material.  Radiation dose for this scan was reduced using automated exposure  control, adjustment of the mA and/or kV according to patient size, or  iterative reconstruction technique.    COMPARISON: None.    FINDINGS:  There is generalized atrophy of the brain. . There is a  mass arising from the sella extending into the suprasellar cistern.  This measures approximately 1.2 cm in cephalocaudad dimension. Extends  up to the optic chiasm. This would be consistent with a pituitary  adenoma. The right maxillary sinus is completely opacified. The medial  wall of the sinuses displaced medially. There is sclerosis  and  thickening of the wall of the sinus. The findings would be consistent  with a mucocele within the right maxillary sinus. Mucosal thickening  is seen in the right frontal sinus and several right anterior ethmoid  sinuses.. There is no evidence of trauma.      Impression    IMPRESSION:   1. No intracranial bleed or skull fractures.  2. Soft tissue mass arising from the sella extending into the  suprasellar region. This is probably due to a pituitary adenoma.  Meningioma is also in the differential diagnosis. MR scan of the sella  would be helpful for further characterization of this lesion. This  lesion could affect the optic chiasm.  3. Opacified right maxillary sinus with medial displacement of the  medial wall of the sinus. The appearance raises the possibility of a  mucocele within the right maxillary sinus.  4. Atrophy of the brain.    MICHAEL GARZON MD   CT Chest Abdomen Pelvis w/o Contrast    Narrative    CT CHEST/ABDOMEN/PELVIS WITHOUT CONTRAST April 20, 2017 2:22 PM    HISTORY: Fall, chest pain, falling hemoglobin. No IV contrast due to  poor renal function.    TECHNIQUE: CT scan obtained of the chest, abdomen, and pelvis without  IV contrast. Radiation dose for this scan was reduced using automated  exposure control, adjustment of the mA and/or kV according to patient  size, or iterative reconstruction technique.    COMPARISON:  None.    FINDINGS:  Chest: There is no acute thoracic aortic abnormality identified within  the limits of unenhanced scanning. Mild coronary artery and thoracic  aortic calcifications are present. No pleural or pericardial  effusions. No pneumothorax identified. There is prominent adenopathy  in the mediastinum. An example at the anterior mediastinum measures  4.1 x 2.2 cm series 3 image 21. Enlarged subcarinal lymph node  measures 2.6 x 1.6 cm image 30. There are other examples. Cannot  exclude hilar enlarged lymph nodes at unenhanced scanning. Axillary  lymph nodes are small.  There may be a tiny sebaceous cyst at the left  anterior axilla image 13. There is a focal ill-defined nodular lesion  measuring 3.0 x 2.1 cm medial anterior left upper lobe series 4 image  22 with some atelectasis leading towards the left hilar region. No  acute airspace disease otherwise seen. Some mild subpleural  emphysematous change suggested. There are no convincing acute  fractures identified. A few subacute fractures noted in the ribs.  There is a destructive bony lesion occupying the left lateral fifth  rib measuring approximately 4.4 x 1.5 cm series 3 image 23.    Abdomen/pelvis: No acute fractures visualized at the abdomen or pelvis  levels. No convincing destructive bony lesions identified at the  abdomen or pelvis. Contracted gallbladder. Unenhanced liver, adrenals,  spleen, pancreas, and kidneys do not show any acute abnormalities. No  hydronephrosis. Nonobstructing small stone lower left kidney is only  0.2 cm image 75. Diffuse vascular calcifications. No acute abdominal  aortic abnormality. A borderline prominent lymph node deep to the  right hemidiaphragm reji 0.8 cm series 3 image 53. Portocaval region  lymph node is 1.6 x 1.1 cm series 3 image 63. No acute bowel  abnormality. No bowel obstruction. Normal appendix. No free fluid or  free air. No evidence for acute hemorrhage.      Impression    IMPRESSION:  1. No acute traumatic abnormality is seen.  2. There are findings worrisome for neoplasm including an irregular  and elongated focal opacity measuring approximately 3 cm at the medial  left upper lobe. This could represent pulmonary neoplasm. There is  adjacent atelectasis leading towards the left hilum. There is also  prominent adenopathy within the mediastinum, and a destructive bone  lesion involving the left lateral fifth rib that may represent rib  metastasis. Recommend further oncologic workup.  3. Mildly enlarged retrocrural right-sided lymph node is nonspecific.  4. Nonobstructing small  stone at the left kidney.    NISH WEBB MD   ABO/Rh type and screen   Result Value Ref Range    ABO Pending     RH(D) Pending     Antibody Screen Pending     Test Valid Only At Pending     Specimen Expires Pending    UA with Microscopic   Result Value Ref Range    Color Urine Yellow     Appearance Urine Clear     Glucose Urine Negative NEG mg/dL    Bilirubin Urine Negative NEG    Ketones Urine Negative NEG mg/dL    Specific Gravity Urine 1.002 (L) 1.003 - 1.035    Blood Urine Negative NEG    pH Urine 6.0 5.0 - 7.0 pH    Protein Albumin Urine Negative NEG mg/dL    Urobilinogen mg/dL Normal 0.0 - 2.0 mg/dL    Nitrite Urine Negative NEG    Leukocyte Esterase Urine Negative NEG    Source Midstream Urine     WBC Urine 1 0 - 2 /HPF    RBC Urine 1 0 - 2 /HPF   Lactic acid whole blood   Result Value Ref Range    Lactic Acid 1.1 0.7 - 2.1 mmol/L       Medications   lactated ringers infusion (not administered)   potassium chloride 10 mEq in 100 mL intermittent infusion with 10 mg lidocaine (10 mEq Intravenous New Bag 4/20/17 1645)   lactated ringers BOLUS 2,244 mL (0 mLs Intravenous Stopped 4/20/17 1522)   thiamine (B-1) 100 mg in NaCl 0.9 % 100 mL /injection ( Intravenous Stopped 4/20/17 1554)   potassium chloride (KLOR-CON) Packet 20-40 mEq (40 mEq Oral or Feeding Tube Given 4/20/17 1645)       13:34 Patient assessed. Course of care outlined.    Assessments & Plan (with Medical Decision Making)     57-year-old male with history of chronic alcohol abuse and no prior medical care presents with hypotension after being checked on by police for health and welfare evaluation.  He was seen immediately and evaluated and resuscitated with crystalloid with resolution of his hypotension after 2 L of saline.  An aggressive workup was initiated.  Throughout this time my suspicion for sepsis as a cause for his hypotension was low.  He had no infectious symptoms, had profound anemia, and evidence of poor nutrition, and had no fever or  leukocytosis.  My suspicion for an infectious process remained low upon completion of the evaluation.  There was not good evidence that his anemia was due to acute blood loss with no blood on rectal examination and no history of abdominal pain, hematemesis, melena, hematochezia.  Chronic blood loss such as to to gastric ulcer or colon cancer are certainly possibilities.  A workup was initiated with tests for iron, iron-binding capacity, ferritin, peripheral blood smear, B-12, folate levels.  These are pending.      Because of his history of fall and chronic alcohol abuse I felt he needed testing to ensure no intracranial injury, chest injury, or abdominal source of blood loss or injury.  CT scan of the brain chest abdomen and pelvis was performed.  Results are as above.  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  In addition he has a mass in the lung worrisome for neoplasm which may be associated with the anemia which may be due to chronic disease.  We initiated blood transfusion after consent because of hemoglobin of 6.6 expected to drop significantly further with hydration and because of the associated hypotension.  INR was normal.  Given his history of chronic alcohol abuse, esophageal varices and cirrhosis are possibilities though my suspicion for varices in particular is low as a cause for hypotension and bleeding because I would expect to see evidence of this with hematemesis, melena, hematochezia.  There is no evidence of pneumonia on his chest x-ray.  No evidence of an intra-abdominal source of bleeding or infection.  I discussed all these findings with him.  He is agreeable to admission for monitoring, treatment, and workup.  He was also found to be hypokalemic likely on the nutritional basis.  I gave him a couple of doses of potassium but do not want to aggressively replete potassium until we know he is making urine and his renal function is improving.  I discussed his  case with Dr. Schrader, of the hospital service who is agreed to accept him for admission    I have reviewed the nursing notes.    I have reviewed the findings, diagnosis, plan and need for follow up with the patient.    New Prescriptions    No medications on file       Final diagnoses:   Hypotension, unspecified hypotension type   Anemia, unspecified type   Lung mass   Brain mass   Hypokalemia     This document serves as a record of the services and decisions personally performed and made by No att. providers found. It was created on HIS/HER behalf by Tabatha Gaffney, a trained medical scribe. The creation of this document is based the provider's statements to the medical scribe.  Tabatha Gaffney 1:34 PM 4/20/2017    Provider:   The information in this document, created by the medical scribe for me, accurately reflects the services I personally performed and the decisions made by me. I have reviewed and approved this document for accuracy prior to leaving the patient care area.  No att. providers found 1:34 PM 4/20/2017 4/20/2017   [facility] EMERGENCY DEPARTMENT     Blayne Guzman MD  04/20/17 8573     PAST MEDICAL HISTORY:  Acid reflux     COPD, mild     History of IBS     Hypertension     Premature ventricular beat     Redundant colon

## 2024-11-04 NOTE — ED PROVIDER NOTE - CLINICAL SUMMARY MEDICAL DECISION MAKING FREE TEXT BOX
Fellow MD Renaldo Barfield: 79-year-old female with past medical history of COPD, CAD status post 3 stents on aspirin, atrial fibrillation on Eliquis, GERD, presenting to the ED for evaluation of worsening dyspnea on exertion over the past several months.  Patient symptoms began 2 years ago and have progressively been worsening over the past 7 months.  Patient has seen pulmonologist as well as cardiologist with no significant improvement in her symptoms.  Patient is currently finishing a course of steroids with no improvement.  Patient reports her dyspnea on exertion exacerbates when talking, walking, eating, and is alleviated by rest.  They report the patient also has a pulse oximetry at home that is 99 to 100% at rest and drops to 86% when she is walking.  Patient is not on any home oxygen.  She denies any chest pain, cough, fever, chills, nausea, vomiting, diarrhea, abdominal pain, dysuria, hematuria, flank pain, headache, and any additional complaints at this time.  No recent travel or sick contacts.    On arrival to the ED, the patient is normotensive, nontachycardic, afebrile, and satting 98% on room air.  Patient is tachypneic with clear lungs.  No wheezing, rales, or rhonchi.  No stridor.  Abdomen is soft and nontender.  Strength is 5/5 and symmetric bilaterally.  Plan for labs, chest x-ray, EKG, and likely admission. Forrester:  79-year-old female with past medical history of COPD, CAD status post 3 stents on aspirin, atrial fibrillation on Eliquis, GERD, presenting to the ED for evaluation of worsening dyspnea on exertion over the past several months.  Patient symptoms began 2 years ago and have progressively been worsening over the past 7 months.  Patient has seen pulmonologist as well as cardiologist with no significant improvement in her symptoms.  Patient is currently finishing a course of steroids with no improvement.  Patient reports her dyspnea on exertion exacerbates when talking, walking, eating, and is alleviated by rest.  They report the patient also has a pulse oximetry at home that is 99 to 100% at rest and drops to 86% when she is walking.  Patient is not on any home oxygen.  She denies any chest pain, cough, fever, chills, nausea, vomiting, diarrhea, abdominal pain, dysuria, hematuria, flank pain, headache, and any additional complaints at this time.  No recent travel or sick contacts.    On arrival to the ED, the patient is normotensive, nontachycardic, afebrile, and satting 98% on room air.  Patient is tachypneic with clear lungs.  No wheezing, rales, or rhonchi.  No stridor.  Abdomen is soft and nontender.  Strength is 5/5 and symmetric bilaterally.  Plan for labs, chest x-ray, EKG, and likely admission.

## 2024-11-04 NOTE — PATIENT PROFILE ADULT - FALL HARM RISK - HARM RISK INTERVENTIONS

## 2024-11-04 NOTE — ED PROVIDER NOTE - PROGRESS NOTE DETAILS
Fellow MD Renaldo Barfield: Discussed case with Dr. Pike, unattached, who accepts this patient for admission.

## 2024-11-04 NOTE — ED PROVIDER NOTE - PHYSICAL EXAMINATION
Constitutional: Well-appearing, well-nourished, comfortable appearing.   Head: Normocephalic, atraumatic.   Eyes: PERRL. EOMI. No conjunctival pallor.   ENT: Moist mucous membranes. Uvula midline. No pharyngeal erythema or exudates.  Neck: No LAD. Supple.  CVS: Regular rate, regular rhythm. Normal S1, S2. No murmurs, rubs, or gallops. No peripheral edema noted.   Respiratory: Tachypneic. Clear to auscultation bilaterally. No wheezing, rales, or rhonchi. No stridor.   Abdomen: Abd is soft and non-distended. No tenderness. No rebound, guarding, or rigidity.   MSK: No CVA tenderness bilaterally.   Neuro: Alert and oriented to person, place, and time. Follows commands. Moves all extremities.   Skin: Warm and dry. No rashes.   Psych: Normal affect, cooperative.

## 2024-11-04 NOTE — ED ADULT NURSE REASSESSMENT NOTE - NS ED NURSE REASSESS COMMENT FT1
Pt taken to bathroom via wheelchair, pt able to stand and ambulate with 1 ED RN assistance. Pt urine provided and sent to lab for testing. Pt placed back in stretcher, placed on CM AFib. Son and aide at bedside.

## 2024-11-04 NOTE — ED PROVIDER NOTE - OBJECTIVE STATEMENT
see mdm 79-year-old female with past medical history of COPD, CAD status post 3 stents on aspirin, atrial fibrillation on Eliquis, GERD, presenting to the ED for evaluation of worsening dyspnea on exertion over the past several months.  Patient symptoms began 2 years ago and have progressively been worsening over the past 7 months.  Patient has seen pulmonologist as well as cardiologist with no significant improvement in her symptoms.  Patient is currently finishing a course of steroids with no improvement.  Patient reports her dyspnea on exertion exacerbates when talking, walking, eating, and is alleviated by rest.  They report the patient also has a pulse oximetry at home that is 99 to 100% at rest and drops to 86% when she is walking.  Patient is not on any home oxygen.  She denies any chest pain, cough, fever, chills, nausea, vomiting, diarrhea, abdominal pain, dysuria, hematuria, flank pain, headache, and any additional complaints at this time.  No recent travel or sick contacts.

## 2024-11-04 NOTE — H&P ADULT - NSHPLABSRESULTS_GEN_ALL_CORE
Lab Results:  CBC  CBC Full  -  ( 2024 13:23 )  WBC Count : 12.02 K/uL  RBC Count : 3.90 M/uL  Hemoglobin : 11.8 g/dL  Hematocrit : 34.7 %  Platelet Count - Automated : 328 K/uL  Mean Cell Volume : 89.0 fl  Mean Cell Hemoglobin : 30.3 pg  Mean Cell Hemoglobin Concentration : 34.0 g/dL  Auto Neutrophil # : 8.13 K/uL  Auto Lymphocyte # : 2.71 K/uL  Auto Monocyte # : 1.03 K/uL  Auto Eosinophil # : 0.03 K/uL  Auto Basophil # : 0.02 K/uL  Auto Neutrophil % : 67.7 %  Auto Lymphocyte % : 22.5 %  Auto Monocyte % : 8.6 %  Auto Eosinophil % : 0.2 %  Auto Basophil % : 0.2 %    .		Differential:	[] Automated		[] Manual  Chemistry                        11.8   12.02 )-----------( 328      ( 2024 13:23 )             34.7     11-04    132[L]  |  96  |  33[H]  ----------------------------<  97  3.4[L]   |  23  |  1.09    Ca    9.6      2024 13:23  Mg     2.2     11-04    TPro  6.6  /  Alb  4.1  /  TBili  0.3  /  DBili  x   /  AST  17  /  ALT  19  /  AlkPhos  50  11-04    LIVER FUNCTIONS - ( 2024 13:23 )  Alb: 4.1 g/dL / Pro: 6.6 g/dL / ALK PHOS: 50 U/L / ALT: 19 U/L / AST: 17 U/L / GGT: x             Urinalysis Basic - ( 2024 14:43 )    Color: Yellow / Appearance: Cloudy / S.016 / pH: x  Gluc: x / Ketone: Trace mg/dL  / Bili: Negative / Urobili: 0.2 mg/dL   Blood: x / Protein: 30 mg/dL / Nitrite: Negative   Leuk Esterase: Small / RBC: 19 /HPF / WBC 13 /HPF   Sq Epi: x / Non Sq Epi: 2 /HPF / Bacteria: Many /HPF            MICROBIOLOGY/CULTURES:      RADIOLOGY RESULTS: reviewed

## 2024-11-04 NOTE — H&P ADULT - NSHPPHYSICALEXAM_GEN_ALL_CORE
General: WN/WD NAD  PERRLA  Neurology: A&Ox3, nonfocal, PENA x 4  Respiratory: CTA B/L  CV: RRR, S1S2, no murmurs, rubs or gallops  Abdominal: Soft, NT, ND +BS, Last BM  Extremities: No edema, + peripheral pulses  Skin Normal

## 2024-11-05 ENCOUNTER — RESULT REVIEW (OUTPATIENT)
Age: 79
End: 2024-11-05

## 2024-11-05 LAB
ANION GAP SERPL CALC-SCNC: 16 MMOL/L — SIGNIFICANT CHANGE UP (ref 5–17)
APPEARANCE UR: CLEAR — SIGNIFICANT CHANGE UP
BACTERIA # UR AUTO: ABNORMAL /HPF
BILIRUB UR-MCNC: NEGATIVE — SIGNIFICANT CHANGE UP
BUN SERPL-MCNC: 23 MG/DL — SIGNIFICANT CHANGE UP (ref 7–23)
CALCIUM SERPL-MCNC: 9 MG/DL — SIGNIFICANT CHANGE UP (ref 8.4–10.5)
CAST: 0 /LPF — SIGNIFICANT CHANGE UP (ref 0–4)
CHLORIDE SERPL-SCNC: 96 MMOL/L — SIGNIFICANT CHANGE UP (ref 96–108)
CO2 SERPL-SCNC: 19 MMOL/L — LOW (ref 22–31)
COLOR SPEC: YELLOW — SIGNIFICANT CHANGE UP
CREAT SERPL-MCNC: 1.02 MG/DL — SIGNIFICANT CHANGE UP (ref 0.5–1.3)
CULTURE RESULTS: SIGNIFICANT CHANGE UP
DIFF PNL FLD: NEGATIVE — SIGNIFICANT CHANGE UP
EGFR: 55 ML/MIN/1.73M2 — LOW
GLUCOSE SERPL-MCNC: 123 MG/DL — HIGH (ref 70–99)
GLUCOSE UR QL: NEGATIVE MG/DL — SIGNIFICANT CHANGE UP
HCT VFR BLD CALC: 40.7 % — SIGNIFICANT CHANGE UP (ref 34.5–45)
HGB BLD-MCNC: 13.4 G/DL — SIGNIFICANT CHANGE UP (ref 11.5–15.5)
KETONES UR-MCNC: NEGATIVE MG/DL — SIGNIFICANT CHANGE UP
LEUKOCYTE ESTERASE UR-ACNC: ABNORMAL
MCHC RBC-ENTMCNC: 29.9 PG — SIGNIFICANT CHANGE UP (ref 27–34)
MCHC RBC-ENTMCNC: 32.9 G/DL — SIGNIFICANT CHANGE UP (ref 32–36)
MCV RBC AUTO: 90.8 FL — SIGNIFICANT CHANGE UP (ref 80–100)
NITRITE UR-MCNC: NEGATIVE — SIGNIFICANT CHANGE UP
NRBC # BLD: 0 /100 WBCS — SIGNIFICANT CHANGE UP (ref 0–0)
PH UR: 8 — SIGNIFICANT CHANGE UP (ref 5–8)
PLATELET # BLD AUTO: 336 K/UL — SIGNIFICANT CHANGE UP (ref 150–400)
POTASSIUM SERPL-MCNC: 3.9 MMOL/L — SIGNIFICANT CHANGE UP (ref 3.5–5.3)
POTASSIUM SERPL-SCNC: 3.9 MMOL/L — SIGNIFICANT CHANGE UP (ref 3.5–5.3)
PROT UR-MCNC: NEGATIVE MG/DL — SIGNIFICANT CHANGE UP
RBC # BLD: 4.48 M/UL — SIGNIFICANT CHANGE UP (ref 3.8–5.2)
RBC # FLD: 17.6 % — HIGH (ref 10.3–14.5)
RBC CASTS # UR COMP ASSIST: 2 /HPF — SIGNIFICANT CHANGE UP (ref 0–4)
SODIUM SERPL-SCNC: 131 MMOL/L — LOW (ref 135–145)
SP GR SPEC: 1.01 — SIGNIFICANT CHANGE UP (ref 1–1.03)
SPECIMEN SOURCE: SIGNIFICANT CHANGE UP
SQUAMOUS # UR AUTO: 3 /HPF — SIGNIFICANT CHANGE UP (ref 0–5)
UROBILINOGEN FLD QL: 0.2 MG/DL — SIGNIFICANT CHANGE UP (ref 0.2–1)
WBC # BLD: 12.23 K/UL — HIGH (ref 3.8–10.5)
WBC # FLD AUTO: 12.23 K/UL — HIGH (ref 3.8–10.5)
WBC UR QL: 14 /HPF — HIGH (ref 0–5)

## 2024-11-05 PROCEDURE — 93010 ELECTROCARDIOGRAM REPORT: CPT

## 2024-11-05 PROCEDURE — 93306 TTE W/DOPPLER COMPLETE: CPT | Mod: 26

## 2024-11-05 RX ORDER — DILTIAZEM HCL 5 MG/ML
5 VIAL (ML) INTRAVENOUS ONCE
Refills: 0 | Status: COMPLETED | OUTPATIENT
Start: 2024-11-05 | End: 2024-11-05

## 2024-11-05 RX ORDER — DILTIAZEM HCL 5 MG/ML
30 VIAL (ML) INTRAVENOUS EVERY 8 HOURS
Refills: 0 | Status: DISCONTINUED | OUTPATIENT
Start: 2024-11-05 | End: 2024-11-05

## 2024-11-05 RX ORDER — CEFTRIAXONE SODIUM 10 G
1000 VIAL (EA) INJECTION EVERY 24 HOURS
Refills: 0 | Status: DISCONTINUED | OUTPATIENT
Start: 2024-11-05 | End: 2024-11-05

## 2024-11-05 RX ORDER — FLUTICASONE PROPIONATE AND SALMETEROL XINAFOATE 230; 21 UG/1; UG/1
1 AEROSOL, METERED RESPIRATORY (INHALATION)
Refills: 0 | Status: DISCONTINUED | OUTPATIENT
Start: 2024-11-05 | End: 2024-11-11

## 2024-11-05 RX ORDER — ACETAMINOPHEN 500 MG
650 TABLET ORAL EVERY 6 HOURS
Refills: 0 | Status: DISCONTINUED | OUTPATIENT
Start: 2024-11-05 | End: 2024-11-12

## 2024-11-05 RX ORDER — APIXABAN 5 MG/1
2.5 TABLET, FILM COATED ORAL EVERY 12 HOURS
Refills: 0 | Status: DISCONTINUED | OUTPATIENT
Start: 2024-11-05 | End: 2024-11-12

## 2024-11-05 RX ORDER — DILTIAZEM HCL 5 MG/ML
60 VIAL (ML) INTRAVENOUS
Refills: 0 | Status: DISCONTINUED | OUTPATIENT
Start: 2024-11-05 | End: 2024-11-08

## 2024-11-05 RX ADMIN — MONTELUKAST SODIUM 10 MILLIGRAM(S): 10 TABLET, FILM COATED ORAL at 11:18

## 2024-11-05 RX ADMIN — IPRATROPIUM BROMIDE AND ALBUTEROL SULFATE 3 MILLILITER(S): .5; 2.5 SOLUTION RESPIRATORY (INHALATION) at 17:54

## 2024-11-05 RX ADMIN — Medication 650 MILLIGRAM(S): at 17:56

## 2024-11-05 RX ADMIN — Medication 5 MILLIGRAM(S): at 21:05

## 2024-11-05 RX ADMIN — CLOPIDOGREL 75 MILLIGRAM(S): 75 TABLET ORAL at 11:12

## 2024-11-05 RX ADMIN — SUCRALFATE 1 GRAM(S): 1 SUSPENSION ORAL at 11:18

## 2024-11-05 RX ADMIN — Medication 60 MILLIGRAM(S): at 11:15

## 2024-11-05 RX ADMIN — Medication 40 MILLIGRAM(S): at 21:06

## 2024-11-05 RX ADMIN — Medication 60 MILLIGRAM(S): at 17:54

## 2024-11-05 RX ADMIN — Medication 5 MILLIGRAM(S): at 05:20

## 2024-11-05 RX ADMIN — APIXABAN 2.5 MILLIGRAM(S): 5 TABLET, FILM COATED ORAL at 17:54

## 2024-11-05 RX ADMIN — DONEPEZIL HYDROCHLORIDE 10 MILLIGRAM(S): 23 TABLET ORAL at 21:06

## 2024-11-05 RX ADMIN — IPRATROPIUM BROMIDE AND ALBUTEROL SULFATE 3 MILLILITER(S): .5; 2.5 SOLUTION RESPIRATORY (INHALATION) at 05:19

## 2024-11-05 RX ADMIN — Medication 650 MILLIGRAM(S): at 18:57

## 2024-11-05 RX ADMIN — Medication 5 MILLIGRAM(S): at 06:50

## 2024-11-05 RX ADMIN — SUCRALFATE 1 GRAM(S): 1 SUSPENSION ORAL at 05:19

## 2024-11-05 RX ADMIN — FLUTICASONE PROPIONATE AND SALMETEROL XINAFOATE 1 DOSE(S): 230; 21 AEROSOL, METERED RESPIRATORY (INHALATION) at 17:55

## 2024-11-05 RX ADMIN — SUCRALFATE 1 GRAM(S): 1 SUSPENSION ORAL at 17:55

## 2024-11-05 RX ADMIN — Medication 5 MILLIGRAM(S): at 07:27

## 2024-11-05 RX ADMIN — IPRATROPIUM BROMIDE AND ALBUTEROL SULFATE 3 MILLILITER(S): .5; 2.5 SOLUTION RESPIRATORY (INHALATION) at 11:13

## 2024-11-05 NOTE — ADVANCED PRACTICE NURSE CONSULT - ASSESSMENT
Arrived on unit, patient was found lying in a low air loss pressure redistribution support surface style bed. The patient  is unable to turn independently and staff assistance x 1was provided. Once turned,  able to view her skin. pureWick present on patient to help divert urine.   Patient's skin is ecchymotic.  Patient  was educated  on the importance  for turning  and positioning  every 2 hours. The use of waffle cushion when out of bed  to chair,  and to shift her Weight every 2 hours while in chair. The importance a keeping her skin clean and dry and  to offload feet/heels , and optimal nutrition.

## 2024-11-05 NOTE — CHART NOTE - NSCHARTNOTEFT_GEN_A_CORE
Notified by RN, patient converted back into Afib RVR from NSR. According to chart review, early this morning patient was in afib with RVR, with a rate up to the 180s. Patient was given IV cardizem (no IV metoprolol as there is a reported allergy to atenolol) and started on cardizem 60mg PO Q6hrs. During the day today, patient converted back to NSR. Patient seen and examined at bedside, with no acute complaints. HR 120s-150s. Patient with complaints of shortness of breath, however, states she presented to the hospital with shortness of breath and it has not worsened. Patient denies palpitations, headache, changes in vision, chest pain.     Vital Signs Last 24 Hrs  T(C): 36.7 (05 Nov 2024 20:28), Max: 37.1 (05 Nov 2024 20:00)  T(F): 98.1 (05 Nov 2024 20:28), Max: 98.8 (05 Nov 2024 20:00)  HR: 126 (05 Nov 2024 20:28) (81 - 135)  BP: 110/73 (05 Nov 2024 20:28) (110/73 - 146/56)  BP(mean): --  RR: 18 (05 Nov 2024 20:28) (18 - 18)  SpO2: 95% (05 Nov 2024 20:28) (95% - 98%)    Parameters below as of 05 Nov 2024 20:28  Patient On (Oxygen Delivery Method): room air    #Afib RVR  - EKG obtained, reading afib RVR  - Notified by RN, patient converted back into Afib RVR from NSR. According to chart review, early this morning patient was in afib with RVR, with a rate up to the 180s. Patient was given IV cardizem ( was not given IV metoprolol as there is a reported allergy to atenolol). Patient was started on cardizem 60mg PO Q6hrs. During the day today, patient converted back to NSR. Patient seen and examined at bedside, with no acute complaints. HR 120s-150s. Patient with complaints of shortness of breath, however, states she presented to the hospital with shortness of breath and it has not worsened. Patient denies palpitations, headache, changes in vision, chest pain.     Vital Signs Last 24 Hrs  T(C): 36.7 (05 Nov 2024 20:28), Max: 37.1 (05 Nov 2024 20:00)  T(F): 98.1 (05 Nov 2024 20:28), Max: 98.8 (05 Nov 2024 20:00)  HR: 126 (05 Nov 2024 20:28) (81 - 135)  BP: 110/73 (05 Nov 2024 20:28) (110/73 - 146/56)  BP(mean): --  RR: 18 (05 Nov 2024 20:28) (18 - 18)  SpO2: 95% (05 Nov 2024 20:28) (95% - 98%)    Parameters below as of 05 Nov 2024 20:28  Patient On (Oxygen Delivery Method): room air    #Afib RVR  - EKG obtained, reading afib RVR  - IV cardizem 5mg, ordered  - Will continue to monitor vital signs   - Will endorse to day team     Mila Valladares PA-C  Internal Medicine   [id number]

## 2024-11-05 NOTE — ADVANCED PRACTICE NURSE CONSULT - REASON FOR CONSULT
Consult to assess patient skin initiated by RN for sacrum deep tissue injury present on admission.    Reason for Admission: JIMENES  History of Present Illness:   79-year-old female with past medical history of COPD, CAD status post 3 stents on aspirin, atrial fibrillation on Eliquis, GERD, presenting to the ED for evaluation of worsening dyspnea on exertion over the past several months.  Patient symptoms began 2 years ago and have progressively been worsening over the past 7 months.  Patient has seen pulmonologist as well as cardiologist with no significant improvement in her symptoms.  Patient is currently finishing a course of steroids with no improvement.  Patient reports her dyspnea on exertion exacerbates when talking, walking, eating, and is alleviated by rest.  They report the patient also has a pulse oximetry at home that is 99 to 100% at rest and drops to 86% when she is walking.  Patient is not on any home oxygen.  She denies any chest pain, cough, fever, chills, nausea, vomiting, diarrhea, abdominal pain, dysuria, hematuria, flank pain, headache, and any additional complaints at this time.  No recent travel or sick contacts.

## 2024-11-05 NOTE — ADVANCED PRACTICE NURSE CONSULT - RECOMMEDATIONS
Impression  urinary incontinence    Recommendations   1.   Incontinent management - incontinent cleanser, pads,  pieter care  BID  2. Right and left heel  Elevate heels; apply Complete Cair air fluidized boots; ensure that the soles of the feet are not resting on the foot board of the bed.  3  Maintain on an alternating air with low air loss surface   4. Turn & reposition every 2 hr; Use positioning pillow to turn and reposition, soft pillow between bony prominences; continue measures to decrease friction/shear/pressure.  5. Nutrition optimization.  6. Place  waffle cushion when out of bed to chair .  7. Consider podiatry for treatment of feet/toes.  Plan of care reviewed with covering staff TRICIA Mao

## 2024-11-05 NOTE — SBIRT NOTE ADULT - NSSBIRTUNABLESCROTHER_GEN_A_CORE
Patient declined as she reports she consumes an occasional glass of wine at dinner and has no concerns at this time.

## 2024-11-05 NOTE — CHART NOTE - NSCHARTNOTEFT_GEN_A_CORE
Patient not available to be seen at time of rounds, chart reviewed:    Here for SOB  CXR clear  Concern for A Fib/COPD per pulmonary  UA negative/equivocal  Mild leukocytosis, no fevers  No apparent symptoms UTI    Low suspicion for infection at this point; no apparent findings UTI or PNA    Likely stress reaction for leukocytosis    - Monitor off abx for now  - Low threshold to restart Ceftriaxone if clinical worsening/signs infection  - F/U pending cultures  - Noninfectious causes SOB/A Fib per team  - Monitor closely, low threshold to resume abx if worsneing    Will complete formal consult when patient available    Vaibhav Alvarado MD  Contact on TEAMS messaging from 9am - 5pm  From 5pm-9am, on weekends, or if no response call [phone]

## 2024-11-05 NOTE — CONSULT NOTE ADULT - ASSESSMENT
SOB  chronic over a year   has had PCI less than a year ago but she states no improvement   ? due to rapid afib  copd  fu with pulm   obtain echo   rate control     chronic afib   HR not well controlled  increase cardizem to 60 q6  resume a/c if cleared by primary team  /optho ( held on admission for subconj. hem. )     CAD  s/p PCI x 5  on plavix  on statin   obtain echo     Advanced care planning was discussed with patient and family.  Risks, benefits and alternatives of the cardiac treatments and medical therapy including procedures were discussed in detail and all questions were answered. Importance of compliance with medical therapy and lifestyle modification to improve cardiovascular health were addressed. Appropriate forms and patient educational materials were reviewed. 30 minutes face to face spent.   SOB  chronic over a year   has had PCI less than a year ago but she states no improvement   ? due to rapid afib  copd  fu with pulm   obtain echo   rate control     chronic afib   HR not well controlled  limit nebs as possible   increase cardizem to 60 q6  resume a/c if cleared by primary team  /optho ( held on admission for subconj. hem. )     HTN  dc amlodipine as on cardizem     CAD  s/p PCI x 5  on plavix  on statin   obtain echo     Advanced care planning was discussed with patient and family.  Risks, benefits and alternatives of the cardiac treatments and medical therapy including procedures were discussed in detail and all questions were answered. Importance of compliance with medical therapy and lifestyle modification to improve cardiovascular health were addressed. Appropriate forms and patient educational materials were reviewed. 30 minutes face to face spent.

## 2024-11-05 NOTE — CONSULT NOTE ADULT - ASSESSMENT
79-year-old female with past medical history of COPD/Asthma, CAD status post 3 stents on aspirin, atrial fibrillation on Eliquis, GERD, presenting to the ED for evaluation of worsening dyspnea on exertion over the past several months.  Patient symptoms began 2 years ago and have progressively been worsening over the past 7 months.  Patient has seen pulmonologist as well as cardiologist with no significant improvement in her symptoms.  Patient is currently finishing a course of steroids with no improvement.  Patient reports her dyspnea on exertion exacerbates when talking, walking, eating, and is alleviated by rest.  They report the patient also has a pulse oximetry at home that is 99 to 100% at rest and drops to 86% when she is walking.  Patient is not on any home oxygen.  She denies any chest pain, cough, fever, chills, nausea, vomiting, diarrhea, abdominal pain, dysuria, hematuria, flank pain, headache, and any additional complaints at this time.  No recent travel or sick contacts.   (04 Nov 2024 16:36)  she says she has asthma and copd and she is taking brezteri at home:   currently she came in to hospital with increasing SOB:     SOB/JIMENES:  CArds/ S/P PCI :  A fibrillation  GERD      JIMENES/copd/asthma  - now found to have rapid a fib   - cw duonebs  - will monitor   -ADD ADVAIR:  SHE TAKES YULI A PEÑA:    -FOLLOW UP NEW ECHO : JUST DONE:    -SHE HAS NOT BEEN WHEEZING MUCH  : BUT IF HER SOB DOES NOT IMPROVE  WILL GIVE A TRIAL OF IV STEROIDS   -CT CHEST NON CONTRAST      Afib with RVR  - telemonitor  - started cardiazem   - restart eliquis   -DEFER TO CARDIOLOGY      CAD  - cw asa  - FOLLOW UP NEW ECHO : PRLIM ECHO DID SHOW REDUCED LV FUNCTION       GERD  - cw carafate      DW ACP

## 2024-11-06 ENCOUNTER — RESULT REVIEW (OUTPATIENT)
Age: 79
End: 2024-11-06

## 2024-11-06 LAB
ADD ON TEST-SPECIMEN IN LAB: SIGNIFICANT CHANGE UP
ANION GAP SERPL CALC-SCNC: 14 MMOL/L — SIGNIFICANT CHANGE UP (ref 5–17)
BUN SERPL-MCNC: 19 MG/DL — SIGNIFICANT CHANGE UP (ref 7–23)
CALCIUM SERPL-MCNC: 9.2 MG/DL — SIGNIFICANT CHANGE UP (ref 8.4–10.5)
CANCER AG125 SERPL-ACNC: 20 U/ML — SIGNIFICANT CHANGE UP
CANCER AG19-9 SERPL-ACNC: 73 U/ML — HIGH
CHLORIDE SERPL-SCNC: 99 MMOL/L — SIGNIFICANT CHANGE UP (ref 96–108)
CO2 SERPL-SCNC: 20 MMOL/L — LOW (ref 22–31)
CREAT SERPL-MCNC: 0.94 MG/DL — SIGNIFICANT CHANGE UP (ref 0.5–1.3)
EGFR: 61 ML/MIN/1.73M2 — SIGNIFICANT CHANGE UP
GLUCOSE SERPL-MCNC: 101 MG/DL — HIGH (ref 70–99)
HCT VFR BLD CALC: 38.3 % — SIGNIFICANT CHANGE UP (ref 34.5–45)
HGB BLD-MCNC: 13 G/DL — SIGNIFICANT CHANGE UP (ref 11.5–15.5)
MCHC RBC-ENTMCNC: 30.8 PG — SIGNIFICANT CHANGE UP (ref 27–34)
MCHC RBC-ENTMCNC: 33.9 G/DL — SIGNIFICANT CHANGE UP (ref 32–36)
MCV RBC AUTO: 90.8 FL — SIGNIFICANT CHANGE UP (ref 80–100)
NRBC # BLD: 0 /100 WBCS — SIGNIFICANT CHANGE UP (ref 0–0)
PLATELET # BLD AUTO: 318 K/UL — SIGNIFICANT CHANGE UP (ref 150–400)
POTASSIUM SERPL-MCNC: 3.5 MMOL/L — SIGNIFICANT CHANGE UP (ref 3.5–5.3)
POTASSIUM SERPL-SCNC: 3.5 MMOL/L — SIGNIFICANT CHANGE UP (ref 3.5–5.3)
RBC # BLD: 4.22 M/UL — SIGNIFICANT CHANGE UP (ref 3.8–5.2)
RBC # FLD: 17.5 % — HIGH (ref 10.3–14.5)
SODIUM SERPL-SCNC: 133 MMOL/L — LOW (ref 135–145)
TROPONIN T, HIGH SENSITIVITY RESULT: 24 NG/L — SIGNIFICANT CHANGE UP (ref 0–51)
WBC # BLD: 9.84 K/UL — SIGNIFICANT CHANGE UP (ref 3.8–10.5)
WBC # FLD AUTO: 9.84 K/UL — SIGNIFICANT CHANGE UP (ref 3.8–10.5)

## 2024-11-06 PROCEDURE — 93018 CV STRESS TEST I&R ONLY: CPT

## 2024-11-06 PROCEDURE — 93010 ELECTROCARDIOGRAM REPORT: CPT

## 2024-11-06 PROCEDURE — 99222 1ST HOSP IP/OBS MODERATE 55: CPT

## 2024-11-06 PROCEDURE — 78452 HT MUSCLE IMAGE SPECT MULT: CPT | Mod: 26

## 2024-11-06 PROCEDURE — 93016 CV STRESS TEST SUPVJ ONLY: CPT

## 2024-11-06 PROCEDURE — 71250 CT THORAX DX C-: CPT | Mod: 26

## 2024-11-06 RX ORDER — ONDANSETRON HYDROCHLORIDE 2 MG/ML
4 INJECTION, SOLUTION INTRAMUSCULAR; INTRAVENOUS ONCE
Refills: 0 | Status: COMPLETED | OUTPATIENT
Start: 2024-11-06 | End: 2024-11-06

## 2024-11-06 RX ORDER — GUAIFENESIN 100 MG/5ML
600 LIQUID ORAL EVERY 12 HOURS
Refills: 0 | Status: DISCONTINUED | OUTPATIENT
Start: 2024-11-06 | End: 2024-11-06

## 2024-11-06 RX ADMIN — Medication 650 MILLIGRAM(S): at 14:46

## 2024-11-06 RX ADMIN — SUCRALFATE 1 GRAM(S): 1 SUSPENSION ORAL at 00:05

## 2024-11-06 RX ADMIN — IPRATROPIUM BROMIDE AND ALBUTEROL SULFATE 3 MILLILITER(S): .5; 2.5 SOLUTION RESPIRATORY (INHALATION) at 00:04

## 2024-11-06 RX ADMIN — Medication 60 MILLIGRAM(S): at 00:05

## 2024-11-06 RX ADMIN — Medication 650 MILLIGRAM(S): at 09:31

## 2024-11-06 RX ADMIN — Medication 60 MILLIGRAM(S): at 21:11

## 2024-11-06 RX ADMIN — APIXABAN 2.5 MILLIGRAM(S): 5 TABLET, FILM COATED ORAL at 17:37

## 2024-11-06 RX ADMIN — MONTELUKAST SODIUM 10 MILLIGRAM(S): 10 TABLET, FILM COATED ORAL at 15:22

## 2024-11-06 RX ADMIN — Medication 40 MILLIGRAM(S): at 21:12

## 2024-11-06 RX ADMIN — DONEPEZIL HYDROCHLORIDE 10 MILLIGRAM(S): 23 TABLET ORAL at 21:12

## 2024-11-06 RX ADMIN — IPRATROPIUM BROMIDE AND ALBUTEROL SULFATE 3 MILLILITER(S): .5; 2.5 SOLUTION RESPIRATORY (INHALATION) at 05:14

## 2024-11-06 RX ADMIN — Medication 650 MILLIGRAM(S): at 15:16

## 2024-11-06 RX ADMIN — SUCRALFATE 1 GRAM(S): 1 SUSPENSION ORAL at 15:23

## 2024-11-06 RX ADMIN — CLOPIDOGREL 75 MILLIGRAM(S): 75 TABLET ORAL at 15:22

## 2024-11-06 RX ADMIN — ONDANSETRON HYDROCHLORIDE 4 MILLIGRAM(S): 2 INJECTION, SOLUTION INTRAMUSCULAR; INTRAVENOUS at 10:13

## 2024-11-06 RX ADMIN — Medication 60 MILLIGRAM(S): at 05:14

## 2024-11-06 RX ADMIN — APIXABAN 2.5 MILLIGRAM(S): 5 TABLET, FILM COATED ORAL at 05:14

## 2024-11-06 RX ADMIN — Medication 60 MILLIGRAM(S): at 15:21

## 2024-11-06 RX ADMIN — FLUTICASONE PROPIONATE AND SALMETEROL XINAFOATE 1 DOSE(S): 230; 21 AEROSOL, METERED RESPIRATORY (INHALATION) at 17:37

## 2024-11-06 RX ADMIN — FLUTICASONE PROPIONATE AND SALMETEROL XINAFOATE 1 DOSE(S): 230; 21 AEROSOL, METERED RESPIRATORY (INHALATION) at 05:14

## 2024-11-06 RX ADMIN — SUCRALFATE 1 GRAM(S): 1 SUSPENSION ORAL at 05:15

## 2024-11-06 RX ADMIN — IPRATROPIUM BROMIDE AND ALBUTEROL SULFATE 3 MILLILITER(S): .5; 2.5 SOLUTION RESPIRATORY (INHALATION) at 15:23

## 2024-11-06 RX ADMIN — Medication 650 MILLIGRAM(S): at 09:01

## 2024-11-06 NOTE — DIETITIAN INITIAL EVALUATION ADULT - ORAL INTAKE PTA/DIET HISTORY
Patient visited. Patient in bathroom, son present at bedside and able to provide nutrition history. Per son, reports Patient adhering to a low sodium diet, and fluctuating, but overall fair appetite prior to admission. Son reports Patient has 3 small meals daily, and Patient's aid prepares most meals. NFKA or intolerances reported.

## 2024-11-06 NOTE — DIETITIAN INITIAL EVALUATION ADULT - REASON
Nutrition focused physical exam deferred. Patient not at bed side during visit. RD to perform at follow up or as able.

## 2024-11-06 NOTE — DIETITIAN INITIAL EVALUATION ADULT - REASON INDICATOR FOR ASSESSMENT
Dietitian consult received for: Pressure injury stage 2 or >   STAR Patient   Chart reviewed, events noted  Information obtained from: electronic medical record, Patient's son

## 2024-11-06 NOTE — CONSULT NOTE ADULT - ASSESSMENT
Overall, Hypoxia, abnormal UA  - Monitor off abx for now  - Monitor for signs/symptoms UTI, if present start treatment for UTI  - Workup for hypoxia per team  - Monitor for fevers/leukocytosis      Data/Rationale:  80 yo F with COPD, CAD, GERD with Ortiz  No fever, prior leukocytosis  Here with SOB, undergoing workup  ID called for leukocytosis, no resolved  UA neg/equivocal, UCX contam  No symptoms UTI  No other symptoms infection, no signs pneumonia  Low suspicion infection, appears well at bedside    Vaibhav Alvarado MD  Contact on TEAMS messaging from 9am - 5pm  From 5pm-9am, on weekends, or if no response call [phone]

## 2024-11-06 NOTE — DIETITIAN INITIAL EVALUATION ADULT - ENERGY INTAKE
Per nursing flowsheets, fair to adequate PO intake during admission, %. Patient's son reports poor / fair appetite during admission. Dietary preferences and dislikes reviewed. Food and nutrition services to be made aware. Offered oral nutrition shake to optimize PO intake. Patient's son accepting./Fair (50-75%)

## 2024-11-06 NOTE — DIETITIAN INITIAL EVALUATION ADULT - PHYSCIAL ASSESSMENT
- Per Juwan HOWELL RD notes recent weights: 125.8lbs (11/22/2023), 121.7lbs (11/04/2024). Relatively stable weight x 1 year indicated.    - Per son, reports Patient with mild weight loss x 3-4 months prior to admission. Patient's son reports usual body weight 127lbs. Per electronic medical record, current dosing weight 121lbs (11/04/2024). 4.7% weight loss x 3-4 months indicated.

## 2024-11-06 NOTE — DIETITIAN INITIAL EVALUATION ADULT - LITERATURE/VIDEOS GIVEN
Reviewed CHF diet education. Discussed Na restriction, foods high in Na to avoid, reading food labels, tips for limiting Na in your diet. Reviewed daily weights, Wt gain parameters to contact MD. Discussed Na intake in relation to fluid retention, edema and Wt gain. Pt's son verbalized understanding and accepted Heart Healthy Nutrition Therapy Handout.  RD remains available for diet education review.

## 2024-11-06 NOTE — DIETITIAN INITIAL EVALUATION ADULT - PERTINENT LABORATORY DATA
11-06    133[L]  |  99  |  19  ----------------------------<  101[H]  3.5   |  20[L]  |  0.94    Ca    9.2      06 Nov 2024 06:02

## 2024-11-06 NOTE — CONSULT NOTE ADULT - SUBJECTIVE AND OBJECTIVE BOX
11-05-24 @ 15:25    Patient is a 79y old  Female who presents with a chief complaint of DELGADO (05 Nov 2024 13:26)      HPI:  79-year-old female with past medical history of COPD/Asthma, CAD status post 3 stents on aspirin, atrial fibrillation on Eliquis, GERD, presenting to the ED for evaluation of worsening dyspnea on exertion over the past several months.  Patient symptoms began 2 years ago and have progressively been worsening over the past 7 months.  Patient has seen pulmonologist as well as cardiologist with no significant improvement in her symptoms.  Patient is currently finishing a course of steroids with no improvement.  Patient reports her dyspnea on exertion exacerbates when talking, walking, eating, and is alleviated by rest.  They report the patient also has a pulse oximetry at home that is 99 to 100% at rest and drops to 86% when she is walking.  Patient is not on any home oxygen.  She denies any chest pain, cough, fever, chills, nausea, vomiting, diarrhea, abdominal pain, dysuria, hematuria, flank pain, headache, and any additional complaints at this time.  No recent travel or sick contacts.   (04 Nov 2024 16:36)    she says she has asthma and copd and she is taking brezteri at home:   currently she came in to hospital with increasing SOB:       ?FOLLOWING PRESENT  [x ] Hx of PE/DVT, [ y] Hx COPD, [ y] Hx of Asthma, [ x] Hx of Hospitalization, [x ]  Hx of BiPAP/CPAP use, [ x] Hx of DESIREE    Allergies    codeine (Rash; Urticaria; Hives)  sulfa drugs (Vomiting; Nausea)  atenolol (Hives)  omeprazole (Short breath; Urticaria)    Intolerances        PAST MEDICAL & SURGICAL HISTORY:  COPD, mild      Hypertension      Acid reflux      History of IBS      Premature ventricular beat      Redundant colon      H/O: hysterectomy      History of lumbar surgery      S/P LASIK surgery of both eyes      Brain aneurysm  embolization      H/O coronary angiogram  2 stents          FAMILY HISTORY:      Social History: [ half a  pk x 30 yrs:  quit 30 yrs ago  ] TOBACCO                  [x  ] ETOH                                 [ x ] IVDA/DRUGS    REVIEW OF SYSTEMS      General:	x    Skin/Breast:x  	  Ophthalmologic:  	x  ENMT:	x    Respiratory and Thorax: delgado   	  Cardiovascular:	x    Gastrointestinal:	x    Genitourinary:	x    Musculoskeletal:	x    Neurological:	x    Psychiatric:	x    Hematology/Lymphatics:	x    Endocrine:	x    Allergic/Immunologic:	x    MEDICATIONS  (STANDING):  albuterol/ipratropium for Nebulization 3 milliLiter(s) Nebulizer every 6 hours  apixaban 2.5 milliGRAM(s) Oral every 12 hours  atorvastatin 40 milliGRAM(s) Oral at bedtime  clopidogrel Tablet 75 milliGRAM(s) Oral daily  diltiazem    Tablet 60 milliGRAM(s) Oral four times a day  donepezil 10 milliGRAM(s) Oral at bedtime  montelukast 10 milliGRAM(s) Oral daily  sucralfate 1 Gram(s) Oral four times a day    MEDICATIONS  (PRN):  acetaminophen     Tablet .. 650 milliGRAM(s) Oral every 6 hours PRN Mild Pain (1 - 3)       Vital Signs Last 24 Hrs  T(C): 36.3 (05 Nov 2024 06:41), Max: 36.6 (05 Nov 2024 04:40)  T(F): 97.4 (05 Nov 2024 06:41), Max: 97.8 (05 Nov 2024 04:40)  HR: 135 (05 Nov 2024 06:41) (99 - 135)  BP: 134/83 (05 Nov 2024 06:41) (117/73 - 134/83)  BP(mean): --  RR: 18 (05 Nov 2024 06:41) (18 - 20)  SpO2: 95% (05 Nov 2024 06:41) (95% - 100%)    Parameters below as of 05 Nov 2024 06:41  Patient On (Oxygen Delivery Method): room air    Orthostatic VS          I&O's Summary    05 Nov 2024 07:01  -  05 Nov 2024 15:25  --------------------------------------------------------  IN: 480 mL / OUT: 600 mL / NET: -120 mL        Physical Exam:   GENERAL: NAD, well-groomed, well-developed  HEENT: ANTHONY/   Atraumatic, Normocephalic  ENMT: No tonsillar erythema, exudates, or enlargement; Moist mucous membranes, Good dentition, No lesions  NECK: Supple, No JVD, Normal thyroid  CHEST/LUNG: Clear to auscultation bilaterally; No rales, rhonchi, wheezing, or rubs  CVS: Regular rate and rhythm; No murmurs, rubs, or gallops  GI: : Soft, Nontender, Nondistended; Bowel sounds present  NERVOUS SYSTEM:  Alert & Oriented X3, Good concentration; Motor Strength 5/5 B/L upper and lower extremities; DTRs 2+ intact and symmetric  EXTREMITIES:  2+ Peripheral Pulses, No clubbing, cyanosis, or edema  LYMPH: No lymphadenopathy noted  SKIN: No rashes or lesions  ENDOCRINOLOGY: No Thyromegaly  PSYCH: Appropriate    Labs:  Venous<31<4>>25<<7.515>>Venous<<3><<4><<5<<259>>                            13.4   12.23 )-----------( 336      ( 05 Nov 2024 06:28 )             40.7                         11.8   12.02 )-----------( 328      ( 04 Nov 2024 13:23 )             34.7     11-05    131[L]  |  96  |  23  ----------------------------<  123[H]  3.9   |  19[L]  |  1.02  11-04    132[L]  |  96  |  33[H]  ----------------------------<  97  3.4[L]   |  23  |  1.09    Ca    9.0      05 Nov 2024 06:28  Ca    9.6      04 Nov 2024 13:23  Mg     2.2     11-04    TPro  6.6  /  Alb  4.1  /  TBili  0.3  /  DBili  x   /  AST  17  /  ALT  19  /  AlkPhos  50  11-04    CAPILLARY BLOOD GLUCOSE        LIVER FUNCTIONS - ( 04 Nov 2024 13:23 )  Alb: 4.1 g/dL / Pro: 6.6 g/dL / ALK PHOS: 50 U/L / ALT: 19 U/L / AST: 17 U/L / GGT: x             Urinalysis Basic - ( 05 Nov 2024 06:28 )    Color: x / Appearance: x / SG: x / pH: x  Gluc: 123 mg/dL / Ketone: x  / Bili: x / Urobili: x   Blood: x / Protein: x / Nitrite: x   Leuk Esterase: x / RBC: x / WBC x   Sq Epi: x / Non Sq Epi: x / Bacteria: x      D DImerLactate, Blood: 2.0 mmol/L (11-04 @ 16:30)        Studies  Chest X-RAY  CT SCAN Chest   CT Abdomen  Venous Dopplers: LE:   Others      rad< from: POCUS ED TTE 2D F/U, Limited w/o Cont. (11.04.24 @ 15:11) >  FOCUSED ED ULTRASOUND REPORT          INTERPRETATION:  INTERPRETATION:  A focused transthoracic cardiac ultrasound examination was performed.  A trace pericardial effusion was visualized.  No global wall motion abnormality was identified.    IMPRESSION:  No clinically significant pericardial effusion.  The ejection fraction was not noted to be reduced.  The inferior vena cava was less than 2.1cm and demonstrated   collapsibility.    --- End of Report ---            LAUREN COLÓN MD; Attending Emergency Medicine  This document has been electronically signed. Nov 4 2024  3:10PM    < end of copied text >  < from: Xray Chest 1 View- PORTABLE-Urgent (11.04.24 @ 15:06) >  PROCEDURE DATE:  11/04/2024          INTERPRETATION:  EXAMINATION: XR CHEST URGENT    CLINICAL INDICATION: dyspnea on exertion    TECHNIQUE: Single frontal portable view of the chest from 11/4/2024 3:06   PM    COMPARISON: None.    FINDINGS:  The heart size is not accurately assessed on this projection.  The lungs are clear.  There is no pneumothorax or pleural effusion.    IMPRESSION:  Clear lungs.    --- Endof Report ---           ROBLES ONEILL MD; Resident Radiologist  This document has been electronically signed.  LAUREN SHANNON MD; Attending Radiologist  This document has been electronically signed. Nov 4 2024  3:13PM    < end of copied text >              
CHIEF COMPLAINT:Patient is a 79y old  Female who presents with a chief complaint of JIMENES (04 Nov 2024 16:36)      HISTORY OF PRESENT ILLNESS:    79 female with history of chronic afib , copd, cad s/p stents x 5 less than a year ago ( by self report ) , HTN   presents for evaluation of sob  she states her sob, jimenes has been going on for over  a year and now presents here as she was not getting any answer from other doctors  no cp   no dizziness  no syncope   no palpitation     PAST MEDICAL & SURGICAL HISTORY:  COPD, mild      Hypertension      Acid reflux      History of IBS      Premature ventricular beat      Redundant colon      H/O: hysterectomy      History of lumbar surgery      S/P LASIK surgery of both eyes      Brain aneurysm  embolization      H/O coronary angiogram  2 stents              MEDICATIONS:  amLODIPine   Tablet 5 milliGRAM(s) Oral daily  clopidogrel Tablet 75 milliGRAM(s) Oral daily  diltiazem    Tablet 30 milliGRAM(s) Oral every 8 hours      albuterol/ipratropium for Nebulization 3 milliLiter(s) Nebulizer every 6 hours  montelukast 10 milliGRAM(s) Oral daily    donepezil 10 milliGRAM(s) Oral at bedtime    sucralfate 1 Gram(s) Oral four times a day    atorvastatin 40 milliGRAM(s) Oral at bedtime        FAMILY HISTORY:      Non-contributory    SOCIAL HISTORY:    No tobacco, drugs or etoh    Allergies    codeine (Rash; Urticaria; Hives)  sulfa drugs (Vomiting; Nausea)  atenolol (Hives)  omeprazole (Short breath; Urticaria)    Intolerances    	    REVIEW OF SYSTEMS:  as above  The rest of the 14 points ROS reviewed and except above they are unremarkable.        PHYSICAL EXAM:  T(C): 36.3 (11-05-24 @ 06:41), Max: 36.6 (11-05-24 @ 04:40)  HR: 135 (11-05-24 @ 06:41) (84 - 135)  BP: 134/83 (11-05-24 @ 06:41) (113/75 - 134/83)  RR: 18 (11-05-24 @ 06:41) (18 - 23)  SpO2: 95% (11-05-24 @ 06:41) (95% - 100%)  Wt(kg): --  I&O's Summary    JVP: Normal  Neck: supple  Lung: clear   CV: S1 S2 , Murmur:  Abd: soft  Ext: No edema  neuro: Awake / alert  Psych: flat affect  Skin: normal      LABS/DATA:    TELEMETRY: 	  afib with rvr  ECG:  	   	  CARDIAC MARKERS:        < from: Xray Chest 1 View- PORTABLE-Urgent (11.04.24 @ 15:06) >  ACC: [id number] EXAM:  XR CHEST PORTABLE URGENT 1V   ORDERED BY:  YAMILET GOODMAN     PROCEDURE DATE:  11/04/2024          INTERPRETATION:  EXAMINATION: XR CHEST URGENT    CLINICAL INDICATION: dyspnea on exertion    TECHNIQUE: Single frontal portable view of the chest from 11/4/2024 3:06   PM    COMPARISON: None.    FINDINGS:  The heart size is not accurately assessed on this projection.  The lungs are clear.  There is no pneumothorax or pleural effusion.    IMPRESSION:  Clear lungs.    --- Endof Report ---    < end of copied text >                  35 <<== 11-04-24 @ 13:23                              13.4   12.23 )-----------( 336      ( 05 Nov 2024 06:28 )             40.7     11-05    131[L]  |  96  |  23  ----------------------------<  123[H]  3.9   |  19[L]  |  1.02    Ca    9.0      05 Nov 2024 06:28  Mg     2.2     11-04    TPro  6.6  /  Alb  4.1  /  TBili  0.3  /  DBili  x   /  AST  17  /  ALT  19  /  AlkPhos  50  11-04    proBNP:   Lipid Profile:   HgA1c:   TSH:           
Full note to follow    Vaibhav Alvarado MD  Contact on TEAMS messaging from 9am - 5pm  From 5pm-9am, on weekends, or if no response call [phone]
"HPI:  79-year-old female with past medical history of COPD, CAD status post 3 stents on aspirin, atrial fibrillation on Eliquis, GERD, presenting to the ED for evaluation of worsening dyspnea on exertion over the past several months.  Patient symptoms began 2 years ago and have progressively been worsening over the past 7 months.  Patient has seen pulmonologist as well as cardiologist with no significant improvement in her symptoms.  Patient is currently finishing a course of steroids with no improvement.  Patient reports her dyspnea on exertion exacerbates when talking, walking, eating, and is alleviated by rest.  They report the patient also has a pulse oximetry at home that is 99 to 100% at rest and drops to 86% when she is walking.  Patient is not on any home oxygen.  She denies any chest pain, cough, fever, chills, nausea, vomiting, diarrhea, abdominal pain, dysuria, hematuria, flank pain, headache, and any additional complaints at this time.  No recent travel or sick contacts.   (04 Nov 2024 16:36)"    Above reviewed. 80 yo F with COPD, CAD, GERD with Ortiz  Here patient with concern for SOB, currently under eval  No urinary symptoms, no dysuria, no pyuria  No fevers, no chills  No other complaints  ID called for further eval    PAST MEDICAL & SURGICAL HISTORY:  COPD, mild      Hypertension      Acid reflux      History of IBS      Premature ventricular beat      Redundant colon      H/O: hysterectomy      History of lumbar surgery      S/P LASIK surgery of both eyes      Brain aneurysm  embolization      H/O coronary angiogram  2 stents    Allergies    codeine (Rash; Urticaria; Hives)  sulfa drugs (Vomiting; Nausea)  atenolol (Hives)  omeprazole (Short breath; Urticaria)    Intolerances    ANTIMICROBIALS:      OTHER MEDS:  acetaminophen     Tablet .. 650 milliGRAM(s) Oral every 6 hours PRN  albuterol/ipratropium for Nebulization 3 milliLiter(s) Nebulizer every 6 hours  apixaban 2.5 milliGRAM(s) Oral every 12 hours  atorvastatin 40 milliGRAM(s) Oral at bedtime  clopidogrel Tablet 75 milliGRAM(s) Oral daily  diltiazem    Tablet 60 milliGRAM(s) Oral four times a day  donepezil 10 milliGRAM(s) Oral at bedtime  fluticasone propionate/ salmeterol 250-50 MICROgram(s) Diskus 1 Dose(s) Inhalation two times a day  montelukast 10 milliGRAM(s) Oral daily  sucralfate 1 Gram(s) Oral four times a day    SOCIAL HISTORY: No tobacco, no alcohol, no illicit drugs    FAMILY HISTORY: No pertinent family history relating to chief complaint    Drug Dosing Weight  Height (cm): 154.9 (04 Nov 2024 12:16)  Weight (kg): 55.3 (04 Nov 2024 12:16)  BMI (kg/m2): 23 (04 Nov 2024 12:16)  BSA (m2): 1.53 (04 Nov 2024 12:16)    PE:    Vital Signs Last 24 Hrs  T(C): 36.7 (06 Nov 2024 11:31), Max: 37.1 (05 Nov 2024 20:00)  T(F): 98.1 (06 Nov 2024 11:31), Max: 98.8 (05 Nov 2024 20:00)  HR: 103 (06 Nov 2024 15:20) (72 - 126)  BP: 131/69 (06 Nov 2024 15:20) (110/59 - 146/56)  BP(mean): --  RR: 18 (06 Nov 2024 11:31) (18 - 18)  SpO2: 96% (06 Nov 2024 11:31) (95% - 98%)    Parameters below as of 06 Nov 2024 11:31  Patient On (Oxygen Delivery Method): room air    Gen: AOx3, NAD, non-toxic, pleasant  CV: S1+S2 normal, nontachycardic  Resp: Clear bilat, no resp distress, no crackles/wheezes  Abd: Soft, nontender, +BS  Ext: No LE edema, no wounds  : No Ford  IV/Skin: No thrombophlebitis  Msk: No low back pain, no arthralgias, no joint swelling  Neuro: No sensory deficits, no motor deficits    LABS:                        13.0   9.84  )-----------( 318      ( 06 Nov 2024 06:01 )             38.3     11-06    133[L]  |  99  |  19  ----------------------------<  101[H]  3.5   |  20[L]  |  0.94    Ca    9.2      06 Nov 2024 06:02    Urinalysis Basic - ( 06 Nov 2024 06:02 )    Color: x / Appearance: x / SG: x / pH: x  Gluc: 101 mg/dL / Ketone: x  / Bili: x / Urobili: x   Blood: x / Protein: x / Nitrite: x   Leuk Esterase: x / RBC: x / WBC x   Sq Epi: x / Non Sq Epi: x / Bacteria: x    MICROBIOLOGY:    Clean Catch Clean Catch (Midstream)  11-04-24   >=3 organisms. Probable collection contamination.  --  --    RADIOLOGY:    11/6    IMPRESSION:    Mild bronchiectasis in the right middle lobe.    No pneumonia.

## 2024-11-06 NOTE — DIETITIAN INITIAL EVALUATION ADULT - PERSON TAUGHT/METHOD
Diet handouts provided:  Heart Failure Nutrition Therapy  Heart Healthy Label Reading Tips  Heart Healthy Shopping Tips  Sodium (salt) Content of Foods/verbal instruction/written material/son instructed

## 2024-11-06 NOTE — DIETITIAN INITIAL EVALUATION ADULT - ADD RECOMMEND
1. Continue DASH/ TLC Diet as medically appropriate  2. Recommend Ensure Max BID to promote PO intake   3. Recommend daily MVI and Ascorbic Acid to promote wound healing  4. STAR Patient. Recommend daily weights.  5. Monitor routine weights, nutrition related labs, PO intake and tolerance, oral nutrition supplement compliance, and skin integrity.

## 2024-11-06 NOTE — CONSULT NOTE ADULT - GENERAL
General surgery is following.  Abdominal x-ray showed progression of contrast through his colon and decreasing air and he is in small intestine.  Per general surgery patient's ileus appears to be resolving.  Monitor  Rectal tube remains in place.  Patient with diarrhea  No abdominal pain.   details…

## 2024-11-06 NOTE — CHART NOTE - NSCHARTNOTEFT_GEN_A_CORE
RD CHF education chart note    Patient was visited by RD for CHF education. Heart healthy education provided to the patient's son in detail. Discussed heart healthy nutrition therapy, sodium and fluid intake, importance of diet adherence, daily weights monitoring with the patient's son. Reinforced importance of weight gain parameters and importance of contacting MD’s about weight changes. Provided handouts on heart healthy nutrition therapy, reading heart healthy nutrition labels, heart healthy shopping tips and low sodium food list. Patient's son verbalized understanding demonstrated by teach back method. RD contact information left with patient's son for any future questioning.    Kathleen Graham, HERNANDEZ, CDN   Available on teams

## 2024-11-07 ENCOUNTER — TRANSCRIPTION ENCOUNTER (OUTPATIENT)
Age: 79
End: 2024-11-07

## 2024-11-07 PROCEDURE — 99232 SBSQ HOSP IP/OBS MODERATE 35: CPT

## 2024-11-07 RX ORDER — ISOSORBIDE MONONITRATE 60 MG/1
1 TABLET, EXTENDED RELEASE ORAL
Refills: 0 | DISCHARGE

## 2024-11-07 RX ORDER — DILTIAZEM HCL 5 MG/ML
1 VIAL (ML) INTRAVENOUS
Qty: 120 | Refills: 0
Start: 2024-11-07 | End: 2024-12-06

## 2024-11-07 RX ORDER — APIXABAN 5 MG/1
1 TABLET, FILM COATED ORAL
Qty: 60 | Refills: 0
Start: 2024-11-07 | End: 2024-12-06

## 2024-11-07 RX ORDER — ONDANSETRON HYDROCHLORIDE 2 MG/ML
4 INJECTION, SOLUTION INTRAMUSCULAR; INTRAVENOUS ONCE
Refills: 0 | Status: COMPLETED | OUTPATIENT
Start: 2024-11-07 | End: 2024-11-07

## 2024-11-07 RX ORDER — APIXABAN 5 MG/1
1 TABLET, FILM COATED ORAL
Refills: 0 | DISCHARGE

## 2024-11-07 RX ORDER — METHYLPREDNISOLONE ACETATE 80 MG/ML
40 INJECTION, SUSPENSION INTRALESIONAL; INTRAMUSCULAR; INTRASYNOVIAL; SOFT TISSUE EVERY 8 HOURS
Refills: 0 | Status: COMPLETED | OUTPATIENT
Start: 2024-11-07 | End: 2024-11-09

## 2024-11-07 RX ADMIN — FLUTICASONE PROPIONATE AND SALMETEROL XINAFOATE 1 DOSE(S): 230; 21 AEROSOL, METERED RESPIRATORY (INHALATION) at 17:29

## 2024-11-07 RX ADMIN — SUCRALFATE 1 GRAM(S): 1 SUSPENSION ORAL at 17:29

## 2024-11-07 RX ADMIN — Medication 60 MILLIGRAM(S): at 23:12

## 2024-11-07 RX ADMIN — IPRATROPIUM BROMIDE AND ALBUTEROL SULFATE 3 MILLILITER(S): .5; 2.5 SOLUTION RESPIRATORY (INHALATION) at 17:29

## 2024-11-07 RX ADMIN — ONDANSETRON HYDROCHLORIDE 4 MILLIGRAM(S): 2 INJECTION, SOLUTION INTRAMUSCULAR; INTRAVENOUS at 12:42

## 2024-11-07 RX ADMIN — Medication 60 MILLIGRAM(S): at 12:43

## 2024-11-07 RX ADMIN — METHYLPREDNISOLONE ACETATE 40 MILLIGRAM(S): 80 INJECTION, SUSPENSION INTRALESIONAL; INTRAMUSCULAR; INTRASYNOVIAL; SOFT TISSUE at 23:12

## 2024-11-07 RX ADMIN — Medication 60 MILLIGRAM(S): at 03:55

## 2024-11-07 RX ADMIN — APIXABAN 2.5 MILLIGRAM(S): 5 TABLET, FILM COATED ORAL at 06:09

## 2024-11-07 RX ADMIN — DONEPEZIL HYDROCHLORIDE 10 MILLIGRAM(S): 23 TABLET ORAL at 23:11

## 2024-11-07 RX ADMIN — IPRATROPIUM BROMIDE AND ALBUTEROL SULFATE 3 MILLILITER(S): .5; 2.5 SOLUTION RESPIRATORY (INHALATION) at 23:12

## 2024-11-07 RX ADMIN — METHYLPREDNISOLONE ACETATE 40 MILLIGRAM(S): 80 INJECTION, SUSPENSION INTRALESIONAL; INTRAMUSCULAR; INTRASYNOVIAL; SOFT TISSUE at 14:45

## 2024-11-07 RX ADMIN — Medication 60 MILLIGRAM(S): at 18:25

## 2024-11-07 RX ADMIN — Medication 40 MILLIGRAM(S): at 23:11

## 2024-11-07 RX ADMIN — SUCRALFATE 1 GRAM(S): 1 SUSPENSION ORAL at 23:12

## 2024-11-07 RX ADMIN — Medication 650 MILLIGRAM(S): at 11:26

## 2024-11-07 RX ADMIN — Medication 650 MILLIGRAM(S): at 10:56

## 2024-11-07 RX ADMIN — APIXABAN 2.5 MILLIGRAM(S): 5 TABLET, FILM COATED ORAL at 17:31

## 2024-11-07 RX ADMIN — FLUTICASONE PROPIONATE AND SALMETEROL XINAFOATE 1 DOSE(S): 230; 21 AEROSOL, METERED RESPIRATORY (INHALATION) at 06:09

## 2024-11-07 RX ADMIN — IPRATROPIUM BROMIDE AND ALBUTEROL SULFATE 3 MILLILITER(S): .5; 2.5 SOLUTION RESPIRATORY (INHALATION) at 06:09

## 2024-11-07 RX ADMIN — SUCRALFATE 1 GRAM(S): 1 SUSPENSION ORAL at 06:09

## 2024-11-07 NOTE — DISCHARGE NOTE PROVIDER - NSDCCPCAREPLAN_GEN_ALL_CORE_FT
PRINCIPAL DISCHARGE DIAGNOSIS  Diagnosis: Exertional dyspnea  Assessment and Plan of Treatment: Avoid environmental allergens and asthma triggers.  Participate in physical activity as tolerated.  Seek immediate medical attention if the shortness of breath does not improve with asthma treatments, or if you experience chest pain or palpitations.  Call an ambulance if your lips or nail beds become a bluish color.      SECONDARY DISCHARGE DIAGNOSES  Diagnosis: Afib  Assessment and Plan of Treatment: Atrial fibrillation is a common heart rhythm problem which increases the risk of stroke and heat attack.  It helps if you control your blood pressure, avoid alcohol, cut down on caffeine, get treatment for your thyroid if it is overactive, and perform moderate exercise in consultation with your Primary Care Provider.  Call your doctor if you experience chest tightness/pain, lightheadedness, loss of consciousness, shortness of breath (especially with exercise), feel your heart racing or beating unusually, frequent or abnormal bleeding.  It is important to take all your heart medications as prescribed.

## 2024-11-07 NOTE — PHARMACOTHERAPY INTERVENTION NOTE - COMMENTS
Counseled patient on the new medications started here in the hospital. Informed patients the indication, directions and side effects of the medications. Medication listed below.     albuterol/ipratropium for Nebulization 3 milliLiter(s) Nebulizer every 6 hours  apixaban 2.5 milliGRAM(s) Oral every 12 hours  atorvastatin 40 milliGRAM(s) Oral at bedtime  clopidogrel Tablet 75 milliGRAM(s) Oral daily  diltiazem    Tablet 60 milliGRAM(s) Oral four times a day  donepezil 10 milliGRAM(s) Oral at bedtime  fluticasone propionate/ salmeterol 250-50 MICROgram(s) Diskus 1 Dose(s) Inhalation two times a day  montelukast 10 milliGRAM(s) Oral daily  sucralfate 1 Gram(s) Oral four times a day    Printed patient's medication list/schedule on [email or web address].[email or web address] And provided to patient at bedside which reviews indication, scheduled time, and possible side effects of the meds. Patient questions and concerns were answered and addressed. Patient demonstrated understanding.     Avinash (James Conn  Transitions of Care Pharmacist  Available on [employer] Teams (Preferred)  Spectra: [phone]

## 2024-11-07 NOTE — DISCHARGE NOTE PROVIDER - CARE PROVIDER_API CALL
Yeison Flowers  Phone: [phone]  Fax: (   )    -  Follow Up Time: 1 week   Yeison Flowers  Phone: [phone]  Fax: (   )    -  Follow Up Time: 1 week    Angel Heath  Cardiovascular Disease  [address] [address]  Phone: [phone]  Fax: [phone]  Follow Up Time: 2 weeks

## 2024-11-07 NOTE — DISCHARGE NOTE PROVIDER - INSTRUCTIONS
Ensure Max Cans or Servings Per Day:  1       Frequency:  Two Times a day (11-06-24 @ 16:54) [Pending Verification By Attending]

## 2024-11-07 NOTE — DISCHARGE NOTE PROVIDER - HOSPITAL COURSE
HPI:  79-year-old female with past medical history of COPD, CAD status post 3 stents on aspirin, atrial fibrillation on Eliquis, GERD, presenting to the ED for evaluation of worsening dyspnea on exertion over the past several months.  Patient symptoms began 2 years ago and have progressively been worsening over the past 7 months.  Patient has seen pulmonologist as well as cardiologist with no significant improvement in her symptoms.  Patient is currently finishing a course of steroids with no improvement.  Patient reports her dyspnea on exertion exacerbates when talking, walking, eating, and is alleviated by rest.  They report the patient also has a pulse oximetry at home that is 99 to 100% at rest and drops to 86% when she is walking.  Patient is not on any home oxygen.  She denies any chest pain, cough, fever, chills, nausea, vomiting, diarrhea, abdominal pain, dysuria, hematuria, flank pain, headache, and any additional complaints at this time.  No recent travel or sick contacts.   (04 Nov 2024 16:36)    Hospital Course:  Patient was admitted to medicine for dyspnea on exertion likely secondary to COPD exacerbation. CT Chest non-con remarkable for mild bronchiectasis in the right middle lobe, no pneumonia. Started IV solumedrol 40mg IV q6hrs x2 days with __________. Infectious disease low suspicion for infection, no uti and leukocytosis. Patient found to be in Afib with RVR, started on IV cardizem with transition to oral, eliquis adjusted to age and weight. Stress test showed no ischemia. continue medical management.     Patient is medically cleared for discharge. Medication reconciliation reviewed, revised, and resolved with Dr. Pike who had medically cleared patient for discharge with follow-up as advised. Patient is currently stable for discharge to subacute rehab at this time.    Important Medication Changes and Reason:  - start Cardizem 60mg PO q6    Active or Pending Issues Requiring Follow-up:  - Cardiology  - PCP    Advanced Directives:   [ ] Full code  [x] DNR  [ ] Hospice    Discharge Diagnoses:  COPD exacerbation  Afib with RVR  CAD  GERD       HPI:  79-year-old female with past medical history of COPD, CAD status post 3 stents on aspirin, atrial fibrillation on Eliquis, GERD, presenting to the ED for evaluation of worsening dyspnea on exertion over the past several months.  Patient symptoms began 2 years ago and have progressively been worsening over the past 7 months.  Patient has seen pulmonologist as well as cardiologist with no significant improvement in her symptoms.  Patient is currently finishing a course of steroids with no improvement.  Patient reports her dyspnea on exertion exacerbates when talking, walking, eating, and is alleviated by rest.  They report the patient also has a pulse oximetry at home that is 99 to 100% at rest and drops to 86% when she is walking.  Patient is not on any home oxygen.  She denies any chest pain, cough, fever, chills, nausea, vomiting, diarrhea, abdominal pain, dysuria, hematuria, flank pain, headache, and any additional complaints at this time.  No recent travel or sick contacts.   (04 Nov 2024 16:36)    Hospital Course:  Patient was admitted to medicine for dyspnea on exertion likely secondary to COPD exacerbation. CT Chest non-con remarkable for mild bronchiectasis in the right middle lobe, no pneumonia. S/p IV solumedrol 40mg IV q6hrs x3 days, will DC on Prednisone for 5 days. Infectious disease low suspicion for infection, no uti and leukocytosis. Patient found to be in Afib with RVR, started on IV Cardizem with transition to oral, ELIQUIS adjusted to age and weight. Stress test showed no ischemia. continue medical management.     Patient is medically cleared for discharge. Medication reconciliation reviewed, revised, and resolved with Dr. Pike who had medically cleared patient for discharge with follow-up as advised. Patient is currently stable for discharge to subacute rehab at this time.    Important Medication Changes and Reason:  - start Cardizem 60mg PO q6    Active or Pending Issues Requiring Follow-up:  - Cardiology  - PCP    Advanced Directives:   [ ] Full code  [x] DNR  [ ] Hospice    Discharge Diagnoses:  COPD exacerbation  Afib with RVR  CAD  GERD

## 2024-11-07 NOTE — DISCHARGE NOTE PROVIDER - PROVIDER TOKENS
FREE:[LAST:[Kristie],FIRST:[Yeison],PHONE:[phone]],FAX:[(   )    -],FOLLOWUP:[1 week]] FREE:[LAST:[Kristie],FIRST:[Yeison],PHONE:[phone]],FAX:[(   )    -],FOLLOWUP:[1 week]],PROVIDER:[TOKEN:[[id number]],FOLLOWUP:[2 weeks]]

## 2024-11-07 NOTE — PHYSICAL THERAPY INITIAL EVALUATION ADULT - ADDITIONAL COMMENTS
Pt lives in apartment w/ +elevator. Pt lives alone. Pt has [facility] 8hrs 7days. Pt independent w/ RW PTA. Pt needed assist from [facility] for ADL's. Pt owns RW, shower chair, commode.

## 2024-11-07 NOTE — DISCHARGE NOTE PROVIDER - NSDCMRMEDTOKEN_GEN_ALL_CORE_FT
Allegra 180 mg oral tablet: 1 tab(s) orally once a day  apixaban 2.5 mg oral tablet: 1 tab(s) orally every 12 hours  dilTIAZem 60 mg oral tablet: 1 tab(s) orally 4 times a day  donepezil 10 mg oral tablet: 1 tab(s) orally once a day  escitalopram 20 mg oral tablet: 1 tab(s) orally  Lipitor 40 mg oral tablet: 1 tab(s) orally once a day  montelukast 10 mg oral tablet: 1 tab(s) orally once a day  pantoprazole 40 mg oral delayed release tablet: 1 tab(s) orally once a day  Plavix 75 mg oral tablet: 1 tab(s) orally once a day  roflumilast 250 mcg oral tablet: 1 tab(s) orally once a day  sucralfate 1 g oral tablet: 1 tab(s) orally 2 times a day   Allegra 180 mg oral tablet: 1 tab(s) orally once a day  apixaban 2.5 mg oral tablet: 1 tab(s) orally every 12 hours  budesonide/formoterol/glycopyrrolate: 1 puff(s) inhaled 2 times a day patient&#x27;s own medication  digoxin 125 mcg (0.125 mg) oral tablet: 1 tab(s) orally once a day  dilTIAZem 60 mg oral tablet: 1 tab(s) orally 4 times a day  donepezil 10 mg oral tablet: 1 tab(s) orally once a day  escitalopram 20 mg oral tablet: 1 tab(s) orally  ipratropium-albuterol 0.5 mg-2.5 mg/3 mL inhalation solution: 3 milliliter(s) inhaled every 6 hours As needed Shortness of Breath and/or Wheezing  Lipitor 40 mg oral tablet: 1 tab(s) orally once a day  montelukast 10 mg oral tablet: 1 tab(s) orally once a day  pantoprazole 40 mg oral delayed release tablet: 1 tab(s) orally once a day  Plavix 75 mg oral tablet: 1 tab(s) orally once a day  roflumilast 250 mcg oral tablet: 1 tab(s) orally once a day  sucralfate 1 g oral tablet: 1 tab(s) orally 2 times a day

## 2024-11-07 NOTE — PHYSICAL THERAPY INITIAL EVALUATION ADULT - NSPTDISCHREC_GEN_A_CORE
if return home; pt will need homePT; resumption of [facility]; assist w/ all ADL's; polyfly W/C for long distance travel./[facility]

## 2024-11-07 NOTE — PHYSICAL THERAPY INITIAL EVALUATION ADULT - TRANSFER TRAINING, PT EVAL
Pt will perform sit to stand transfers independent w/ least restrictive device in 2 wks to improve overall functional mobility.

## 2024-11-07 NOTE — PROGRESS NOTE ADULT - CONVERSATION DETAILS
GOC discussed with pt and family at bedside  pt remains DNR  Pt has a living will which son will bring today   pt does want to be readmitted to the hospital if she needs to be

## 2024-11-08 LAB
ANION GAP SERPL CALC-SCNC: 15 MMOL/L — SIGNIFICANT CHANGE UP (ref 5–17)
BUN SERPL-MCNC: 18 MG/DL — SIGNIFICANT CHANGE UP (ref 7–23)
CALCIUM SERPL-MCNC: 9.1 MG/DL — SIGNIFICANT CHANGE UP (ref 8.4–10.5)
CHLORIDE SERPL-SCNC: 96 MMOL/L — SIGNIFICANT CHANGE UP (ref 96–108)
CO2 SERPL-SCNC: 18 MMOL/L — LOW (ref 22–31)
CREAT SERPL-MCNC: 0.81 MG/DL — SIGNIFICANT CHANGE UP (ref 0.5–1.3)
EGFR: 73 ML/MIN/1.73M2 — SIGNIFICANT CHANGE UP
GLUCOSE SERPL-MCNC: 176 MG/DL — HIGH (ref 70–99)
POTASSIUM SERPL-MCNC: 4 MMOL/L — SIGNIFICANT CHANGE UP (ref 3.5–5.3)
POTASSIUM SERPL-SCNC: 4 MMOL/L — SIGNIFICANT CHANGE UP (ref 3.5–5.3)
SODIUM SERPL-SCNC: 129 MMOL/L — LOW (ref 135–145)

## 2024-11-08 PROCEDURE — 93010 ELECTROCARDIOGRAM REPORT: CPT

## 2024-11-08 PROCEDURE — 99232 SBSQ HOSP IP/OBS MODERATE 35: CPT

## 2024-11-08 RX ORDER — DIGOXIN 250 MCG
500 TABLET ORAL ONCE
Refills: 0 | Status: COMPLETED | OUTPATIENT
Start: 2024-11-08 | End: 2024-11-08

## 2024-11-08 RX ORDER — DIGOXIN 250 MCG
250 TABLET ORAL DAILY
Refills: 0 | Status: DISCONTINUED | OUTPATIENT
Start: 2024-11-08 | End: 2024-11-08

## 2024-11-08 RX ORDER — DIGOXIN 250 MCG
250 TABLET ORAL ONCE
Refills: 0 | Status: DISCONTINUED | OUTPATIENT
Start: 2024-11-08 | End: 2024-11-10

## 2024-11-08 RX ORDER — DIGOXIN 250 MCG
125 TABLET ORAL DAILY
Refills: 0 | Status: DISCONTINUED | OUTPATIENT
Start: 2024-11-09 | End: 2024-11-12

## 2024-11-08 RX ORDER — DILTIAZEM HCL 5 MG/ML
10 VIAL (ML) INTRAVENOUS
Qty: 125 | Refills: 0 | Status: DISCONTINUED | OUTPATIENT
Start: 2024-11-08 | End: 2024-11-10

## 2024-11-08 RX ORDER — DILTIAZEM HCL 5 MG/ML
10 VIAL (ML) INTRAVENOUS ONCE
Refills: 0 | Status: COMPLETED | OUTPATIENT
Start: 2024-11-08 | End: 2024-11-08

## 2024-11-08 RX ORDER — DIGOXIN 250 MCG
500 TABLET ORAL ONCE
Refills: 0 | Status: DISCONTINUED | OUTPATIENT
Start: 2024-11-08 | End: 2024-11-08

## 2024-11-08 RX ADMIN — METHYLPREDNISOLONE ACETATE 40 MILLIGRAM(S): 80 INJECTION, SUSPENSION INTRALESIONAL; INTRAMUSCULAR; INTRASYNOVIAL; SOFT TISSUE at 22:05

## 2024-11-08 RX ADMIN — SUCRALFATE 1 GRAM(S): 1 SUSPENSION ORAL at 17:14

## 2024-11-08 RX ADMIN — Medication 650 MILLIGRAM(S): at 17:46

## 2024-11-08 RX ADMIN — Medication 60 MILLIGRAM(S): at 06:01

## 2024-11-08 RX ADMIN — Medication 10 MG/HR: at 22:21

## 2024-11-08 RX ADMIN — SUCRALFATE 1 GRAM(S): 1 SUSPENSION ORAL at 06:01

## 2024-11-08 RX ADMIN — Medication 40 MILLIGRAM(S): at 22:05

## 2024-11-08 RX ADMIN — IPRATROPIUM BROMIDE AND ALBUTEROL SULFATE 3 MILLILITER(S): .5; 2.5 SOLUTION RESPIRATORY (INHALATION) at 11:13

## 2024-11-08 RX ADMIN — CLOPIDOGREL 75 MILLIGRAM(S): 75 TABLET ORAL at 11:13

## 2024-11-08 RX ADMIN — Medication 650 MILLIGRAM(S): at 17:16

## 2024-11-08 RX ADMIN — APIXABAN 2.5 MILLIGRAM(S): 5 TABLET, FILM COATED ORAL at 06:03

## 2024-11-08 RX ADMIN — SUCRALFATE 1 GRAM(S): 1 SUSPENSION ORAL at 11:13

## 2024-11-08 RX ADMIN — Medication 650 MILLIGRAM(S): at 23:47

## 2024-11-08 RX ADMIN — IPRATROPIUM BROMIDE AND ALBUTEROL SULFATE 3 MILLILITER(S): .5; 2.5 SOLUTION RESPIRATORY (INHALATION) at 17:14

## 2024-11-08 RX ADMIN — MONTELUKAST SODIUM 10 MILLIGRAM(S): 10 TABLET, FILM COATED ORAL at 11:13

## 2024-11-08 RX ADMIN — Medication 10 MG/HR: at 09:54

## 2024-11-08 RX ADMIN — IPRATROPIUM BROMIDE AND ALBUTEROL SULFATE 3 MILLILITER(S): .5; 2.5 SOLUTION RESPIRATORY (INHALATION) at 06:00

## 2024-11-08 RX ADMIN — IPRATROPIUM BROMIDE AND ALBUTEROL SULFATE 3 MILLILITER(S): .5; 2.5 SOLUTION RESPIRATORY (INHALATION) at 23:49

## 2024-11-08 RX ADMIN — DONEPEZIL HYDROCHLORIDE 10 MILLIGRAM(S): 23 TABLET ORAL at 22:05

## 2024-11-08 RX ADMIN — METHYLPREDNISOLONE ACETATE 40 MILLIGRAM(S): 80 INJECTION, SUSPENSION INTRALESIONAL; INTRAMUSCULAR; INTRASYNOVIAL; SOFT TISSUE at 06:00

## 2024-11-08 RX ADMIN — Medication 10 MILLIGRAM(S): at 08:26

## 2024-11-08 RX ADMIN — APIXABAN 2.5 MILLIGRAM(S): 5 TABLET, FILM COATED ORAL at 17:14

## 2024-11-08 RX ADMIN — Medication 500 MICROGRAM(S): at 08:50

## 2024-11-08 RX ADMIN — SUCRALFATE 1 GRAM(S): 1 SUSPENSION ORAL at 23:48

## 2024-11-08 RX ADMIN — METHYLPREDNISOLONE ACETATE 40 MILLIGRAM(S): 80 INJECTION, SUSPENSION INTRALESIONAL; INTRAMUSCULAR; INTRASYNOVIAL; SOFT TISSUE at 13:26

## 2024-11-08 RX ADMIN — FLUTICASONE PROPIONATE AND SALMETEROL XINAFOATE 1 DOSE(S): 230; 21 AEROSOL, METERED RESPIRATORY (INHALATION) at 06:00

## 2024-11-08 NOTE — PROVIDER CONTACT NOTE (OTHER) - ACTION/TREATMENT ORDERED:
NELIDA Ramirez notified. Do EKG.
provider notified, Cardizem 5mg IVP ordered and given, pt due for PO Cardizem at midnight, stable, will continue to monitor HR and rhythm on tele
provider notified, EKG done at bedside, Cardizem 1ml given, will continue to monitor HR on tele
stat ekg to be done. continuous monitoring in progress. call bell within reach and safety maintained.

## 2024-11-08 NOTE — PROVIDER CONTACT NOTE (OTHER) - SITUATION
Tele conversion from Afib 120-170s to NS 80-90s
HR sustaining 150-160 afib on tele
pt converted back to afib, -120s -- gets cardizem q6
pt converted from NSR to afib on tele, HR sustaining 120-140

## 2024-11-08 NOTE — PROVIDER CONTACT NOTE (OTHER) - REASON
Tele conversion from Afib 120-170s to NS 80-90s
pt converted back to afib
HR sustaining 150-160 afib on tele
pt converted from NSR to afib on tele, HR sustaining 120-140

## 2024-11-08 NOTE — PROVIDER CONTACT NOTE (OTHER) - ASSESSMENT
Pt A&Ox4, able to make needs known. Pt asymptomatic. VSS. No s/s of bleeding. Pt denies cp, denies SOB, denies pain.
PT A&O4. VSS ON RA. pt denies chest pain, palpitations, sob at this time
Pt A&Ox4, able to make needs known. Pt asymptomatic. VSS. No s/s of bleeding. Pt denies cp, denies SOB, denies pain.
Pt A&Ox4, able to make needs known. Pt asymptomatic. VSS. No s/s of bleeding. Pt denies cp, denies SOB, denies pain.

## 2024-11-08 NOTE — PROVIDER CONTACT NOTE (OTHER) - BACKGROUND
Dx 	JIMENES likely sec to COPD exacerbation, duonebs  	?uti, s/p ctx x1  	AFIB, eliquis on hold for subconjunctival hemorrhage
Dx 	JIMENES likely sec to COPD exacerbation, duonebs  	?uti, s/p ctx x1  	AFIB, eliquis on hold for subconjunctival hemorrhage
Dx: JIMENES likely 2/2 COPD exacerbation, AFib RVR - HR not under control  PMH: COPD, CAD s/p 3 stents, AFib on eliquis, GERD
DX: other form of dyspnea

## 2024-11-09 LAB
ANION GAP SERPL CALC-SCNC: 13 MMOL/L — SIGNIFICANT CHANGE UP (ref 5–17)
BUN SERPL-MCNC: 20 MG/DL — SIGNIFICANT CHANGE UP (ref 7–23)
CALCIUM SERPL-MCNC: 9.6 MG/DL — SIGNIFICANT CHANGE UP (ref 8.4–10.5)
CHLORIDE SERPL-SCNC: 98 MMOL/L — SIGNIFICANT CHANGE UP (ref 96–108)
CO2 SERPL-SCNC: 19 MMOL/L — LOW (ref 22–31)
CREAT SERPL-MCNC: 0.96 MG/DL — SIGNIFICANT CHANGE UP (ref 0.5–1.3)
EGFR: 59 ML/MIN/1.73M2 — LOW
GLUCOSE SERPL-MCNC: 166 MG/DL — HIGH (ref 70–99)
MAGNESIUM SERPL-MCNC: 2.1 MG/DL — SIGNIFICANT CHANGE UP (ref 1.6–2.6)
PHOSPHATE SERPL-MCNC: 2.7 MG/DL — SIGNIFICANT CHANGE UP (ref 2.5–4.5)
POTASSIUM SERPL-MCNC: 4 MMOL/L — SIGNIFICANT CHANGE UP (ref 3.5–5.3)
POTASSIUM SERPL-SCNC: 4 MMOL/L — SIGNIFICANT CHANGE UP (ref 3.5–5.3)
SODIUM SERPL-SCNC: 130 MMOL/L — LOW (ref 135–145)

## 2024-11-09 RX ORDER — METHYLPREDNISOLONE ACETATE 80 MG/ML
20 INJECTION, SUSPENSION INTRALESIONAL; INTRAMUSCULAR; INTRASYNOVIAL; SOFT TISSUE EVERY 12 HOURS
Refills: 0 | Status: DISCONTINUED | OUTPATIENT
Start: 2024-11-09 | End: 2024-11-11

## 2024-11-09 RX ADMIN — Medication 10 MG/HR: at 20:10

## 2024-11-09 RX ADMIN — Medication 125 MICROGRAM(S): at 06:44

## 2024-11-09 RX ADMIN — SUCRALFATE 1 GRAM(S): 1 SUSPENSION ORAL at 06:44

## 2024-11-09 RX ADMIN — METHYLPREDNISOLONE ACETATE 40 MILLIGRAM(S): 80 INJECTION, SUSPENSION INTRALESIONAL; INTRAMUSCULAR; INTRASYNOVIAL; SOFT TISSUE at 06:38

## 2024-11-09 RX ADMIN — SUCRALFATE 1 GRAM(S): 1 SUSPENSION ORAL at 22:07

## 2024-11-09 RX ADMIN — FLUTICASONE PROPIONATE AND SALMETEROL XINAFOATE 1 DOSE(S): 230; 21 AEROSOL, METERED RESPIRATORY (INHALATION) at 06:38

## 2024-11-09 RX ADMIN — IPRATROPIUM BROMIDE AND ALBUTEROL SULFATE 3 MILLILITER(S): .5; 2.5 SOLUTION RESPIRATORY (INHALATION) at 18:25

## 2024-11-09 RX ADMIN — FLUTICASONE PROPIONATE AND SALMETEROL XINAFOATE 1 DOSE(S): 230; 21 AEROSOL, METERED RESPIRATORY (INHALATION) at 18:24

## 2024-11-09 RX ADMIN — Medication 650 MILLIGRAM(S): at 00:15

## 2024-11-09 RX ADMIN — MONTELUKAST SODIUM 10 MILLIGRAM(S): 10 TABLET, FILM COATED ORAL at 11:09

## 2024-11-09 RX ADMIN — CLOPIDOGREL 75 MILLIGRAM(S): 75 TABLET ORAL at 11:09

## 2024-11-09 RX ADMIN — IPRATROPIUM BROMIDE AND ALBUTEROL SULFATE 3 MILLILITER(S): .5; 2.5 SOLUTION RESPIRATORY (INHALATION) at 06:38

## 2024-11-09 RX ADMIN — SUCRALFATE 1 GRAM(S): 1 SUSPENSION ORAL at 18:25

## 2024-11-09 RX ADMIN — APIXABAN 2.5 MILLIGRAM(S): 5 TABLET, FILM COATED ORAL at 06:38

## 2024-11-09 RX ADMIN — METHYLPREDNISOLONE ACETATE 20 MILLIGRAM(S): 80 INJECTION, SUSPENSION INTRALESIONAL; INTRAMUSCULAR; INTRASYNOVIAL; SOFT TISSUE at 18:26

## 2024-11-09 RX ADMIN — IPRATROPIUM BROMIDE AND ALBUTEROL SULFATE 3 MILLILITER(S): .5; 2.5 SOLUTION RESPIRATORY (INHALATION) at 11:08

## 2024-11-09 RX ADMIN — APIXABAN 2.5 MILLIGRAM(S): 5 TABLET, FILM COATED ORAL at 18:25

## 2024-11-09 RX ADMIN — SUCRALFATE 1 GRAM(S): 1 SUSPENSION ORAL at 11:09

## 2024-11-09 RX ADMIN — Medication 10 MG/HR: at 08:01

## 2024-11-09 RX ADMIN — DONEPEZIL HYDROCHLORIDE 10 MILLIGRAM(S): 23 TABLET ORAL at 22:07

## 2024-11-09 RX ADMIN — Medication 40 MILLIGRAM(S): at 22:07

## 2024-11-10 LAB
ANION GAP SERPL CALC-SCNC: 12 MMOL/L — SIGNIFICANT CHANGE UP (ref 5–17)
BUN SERPL-MCNC: 23 MG/DL — SIGNIFICANT CHANGE UP (ref 7–23)
CALCIUM SERPL-MCNC: 9 MG/DL — SIGNIFICANT CHANGE UP (ref 8.4–10.5)
CHLORIDE SERPL-SCNC: 98 MMOL/L — SIGNIFICANT CHANGE UP (ref 96–108)
CO2 SERPL-SCNC: 19 MMOL/L — LOW (ref 22–31)
CREAT SERPL-MCNC: 0.88 MG/DL — SIGNIFICANT CHANGE UP (ref 0.5–1.3)
DIGOXIN SERPL-MCNC: 1.3 NG/ML — SIGNIFICANT CHANGE UP (ref 0.8–2)
EGFR: 66 ML/MIN/1.73M2 — SIGNIFICANT CHANGE UP
GLUCOSE SERPL-MCNC: 156 MG/DL — HIGH (ref 70–99)
HCT VFR BLD CALC: 33.5 % — LOW (ref 34.5–45)
HGB BLD-MCNC: 11.7 G/DL — SIGNIFICANT CHANGE UP (ref 11.5–15.5)
MAGNESIUM SERPL-MCNC: 2 MG/DL — SIGNIFICANT CHANGE UP (ref 1.6–2.6)
MCHC RBC-ENTMCNC: 31.1 PG — SIGNIFICANT CHANGE UP (ref 27–34)
MCHC RBC-ENTMCNC: 34.9 G/DL — SIGNIFICANT CHANGE UP (ref 32–36)
MCV RBC AUTO: 89.1 FL — SIGNIFICANT CHANGE UP (ref 80–100)
NRBC # BLD: 0 /100 WBCS — SIGNIFICANT CHANGE UP (ref 0–0)
PHOSPHATE SERPL-MCNC: 2.5 MG/DL — SIGNIFICANT CHANGE UP (ref 2.5–4.5)
PLATELET # BLD AUTO: 276 K/UL — SIGNIFICANT CHANGE UP (ref 150–400)
POTASSIUM SERPL-MCNC: 3.9 MMOL/L — SIGNIFICANT CHANGE UP (ref 3.5–5.3)
POTASSIUM SERPL-SCNC: 3.9 MMOL/L — SIGNIFICANT CHANGE UP (ref 3.5–5.3)
RBC # BLD: 3.76 M/UL — LOW (ref 3.8–5.2)
RBC # FLD: 17.2 % — HIGH (ref 10.3–14.5)
SODIUM SERPL-SCNC: 129 MMOL/L — LOW (ref 135–145)
WBC # BLD: 15.72 K/UL — HIGH (ref 3.8–10.5)
WBC # FLD AUTO: 15.72 K/UL — HIGH (ref 3.8–10.5)

## 2024-11-10 RX ORDER — DILTIAZEM HCL 5 MG/ML
60 VIAL (ML) INTRAVENOUS
Refills: 0 | Status: DISCONTINUED | OUTPATIENT
Start: 2024-11-10 | End: 2024-11-12

## 2024-11-10 RX ORDER — MELATONIN 5 MG
3 TABLET ORAL ONCE
Refills: 0 | Status: COMPLETED | OUTPATIENT
Start: 2024-11-10 | End: 2024-11-10

## 2024-11-10 RX ADMIN — IPRATROPIUM BROMIDE AND ALBUTEROL SULFATE 3 MILLILITER(S): .5; 2.5 SOLUTION RESPIRATORY (INHALATION) at 11:59

## 2024-11-10 RX ADMIN — Medication 125 MICROGRAM(S): at 05:43

## 2024-11-10 RX ADMIN — Medication 60 MILLIGRAM(S): at 17:24

## 2024-11-10 RX ADMIN — CLOPIDOGREL 75 MILLIGRAM(S): 75 TABLET ORAL at 11:59

## 2024-11-10 RX ADMIN — IPRATROPIUM BROMIDE AND ALBUTEROL SULFATE 3 MILLILITER(S): .5; 2.5 SOLUTION RESPIRATORY (INHALATION) at 17:29

## 2024-11-10 RX ADMIN — Medication 40 MILLIGRAM(S): at 21:14

## 2024-11-10 RX ADMIN — SUCRALFATE 1 GRAM(S): 1 SUSPENSION ORAL at 11:59

## 2024-11-10 RX ADMIN — SUCRALFATE 1 GRAM(S): 1 SUSPENSION ORAL at 23:19

## 2024-11-10 RX ADMIN — Medication 3 MILLIGRAM(S): at 21:32

## 2024-11-10 RX ADMIN — Medication 60 MILLIGRAM(S): at 23:19

## 2024-11-10 RX ADMIN — METHYLPREDNISOLONE ACETATE 20 MILLIGRAM(S): 80 INJECTION, SUSPENSION INTRALESIONAL; INTRAMUSCULAR; INTRASYNOVIAL; SOFT TISSUE at 05:43

## 2024-11-10 RX ADMIN — FLUTICASONE PROPIONATE AND SALMETEROL XINAFOATE 1 DOSE(S): 230; 21 AEROSOL, METERED RESPIRATORY (INHALATION) at 05:46

## 2024-11-10 RX ADMIN — APIXABAN 2.5 MILLIGRAM(S): 5 TABLET, FILM COATED ORAL at 17:29

## 2024-11-10 RX ADMIN — MONTELUKAST SODIUM 10 MILLIGRAM(S): 10 TABLET, FILM COATED ORAL at 11:59

## 2024-11-10 RX ADMIN — FLUTICASONE PROPIONATE AND SALMETEROL XINAFOATE 1 DOSE(S): 230; 21 AEROSOL, METERED RESPIRATORY (INHALATION) at 17:24

## 2024-11-10 RX ADMIN — IPRATROPIUM BROMIDE AND ALBUTEROL SULFATE 3 MILLILITER(S): .5; 2.5 SOLUTION RESPIRATORY (INHALATION) at 05:44

## 2024-11-10 RX ADMIN — Medication 60 MILLIGRAM(S): at 11:58

## 2024-11-10 RX ADMIN — SUCRALFATE 1 GRAM(S): 1 SUSPENSION ORAL at 17:25

## 2024-11-10 RX ADMIN — Medication 3 MILLIGRAM(S): at 00:22

## 2024-11-10 RX ADMIN — IPRATROPIUM BROMIDE AND ALBUTEROL SULFATE 3 MILLILITER(S): .5; 2.5 SOLUTION RESPIRATORY (INHALATION) at 00:35

## 2024-11-10 RX ADMIN — METHYLPREDNISOLONE ACETATE 20 MILLIGRAM(S): 80 INJECTION, SUSPENSION INTRALESIONAL; INTRAMUSCULAR; INTRASYNOVIAL; SOFT TISSUE at 17:25

## 2024-11-10 RX ADMIN — DONEPEZIL HYDROCHLORIDE 10 MILLIGRAM(S): 23 TABLET ORAL at 21:14

## 2024-11-10 RX ADMIN — SUCRALFATE 1 GRAM(S): 1 SUSPENSION ORAL at 05:44

## 2024-11-10 RX ADMIN — APIXABAN 2.5 MILLIGRAM(S): 5 TABLET, FILM COATED ORAL at 05:43

## 2024-11-11 LAB
ANION GAP SERPL CALC-SCNC: 13 MMOL/L — SIGNIFICANT CHANGE UP (ref 5–17)
BUN SERPL-MCNC: 27 MG/DL — HIGH (ref 7–23)
CALCIUM SERPL-MCNC: 8.6 MG/DL — SIGNIFICANT CHANGE UP (ref 8.4–10.5)
CHLORIDE SERPL-SCNC: 97 MMOL/L — SIGNIFICANT CHANGE UP (ref 96–108)
CO2 SERPL-SCNC: 19 MMOL/L — LOW (ref 22–31)
CREAT SERPL-MCNC: 0.8 MG/DL — SIGNIFICANT CHANGE UP (ref 0.5–1.3)
EGFR: 74 ML/MIN/1.73M2 — SIGNIFICANT CHANGE UP
GLUCOSE SERPL-MCNC: 145 MG/DL — HIGH (ref 70–99)
HCT VFR BLD CALC: 35.3 % — SIGNIFICANT CHANGE UP (ref 34.5–45)
HGB BLD-MCNC: 11.8 G/DL — SIGNIFICANT CHANGE UP (ref 11.5–15.5)
MCHC RBC-ENTMCNC: 30.6 PG — SIGNIFICANT CHANGE UP (ref 27–34)
MCHC RBC-ENTMCNC: 33.4 G/DL — SIGNIFICANT CHANGE UP (ref 32–36)
MCV RBC AUTO: 91.5 FL — SIGNIFICANT CHANGE UP (ref 80–100)
NRBC # BLD: 0 /100 WBCS — SIGNIFICANT CHANGE UP (ref 0–0)
PLATELET # BLD AUTO: 278 K/UL — SIGNIFICANT CHANGE UP (ref 150–400)
POTASSIUM SERPL-MCNC: 4.1 MMOL/L — SIGNIFICANT CHANGE UP (ref 3.5–5.3)
POTASSIUM SERPL-SCNC: 4.1 MMOL/L — SIGNIFICANT CHANGE UP (ref 3.5–5.3)
RBC # BLD: 3.86 M/UL — SIGNIFICANT CHANGE UP (ref 3.8–5.2)
RBC # FLD: 17.2 % — HIGH (ref 10.3–14.5)
SODIUM SERPL-SCNC: 129 MMOL/L — LOW (ref 135–145)
WBC # BLD: 12.33 K/UL — HIGH (ref 3.8–10.5)
WBC # FLD AUTO: 12.33 K/UL — HIGH (ref 3.8–10.5)

## 2024-11-11 RX ORDER — ROFLUMILAST 500 UG/1
250 TABLET ORAL DAILY
Refills: 0 | Status: DISCONTINUED | OUTPATIENT
Start: 2024-11-11 | End: 2024-11-12

## 2024-11-11 RX ORDER — IPRATROPIUM BROMIDE AND ALBUTEROL SULFATE .5; 2.5 MG/3ML; MG/3ML
3 SOLUTION RESPIRATORY (INHALATION) EVERY 6 HOURS
Refills: 0 | Status: DISCONTINUED | OUTPATIENT
Start: 2024-11-11 | End: 2024-11-12

## 2024-11-11 RX ADMIN — SUCRALFATE 1 GRAM(S): 1 SUSPENSION ORAL at 05:48

## 2024-11-11 RX ADMIN — FLUTICASONE PROPIONATE AND SALMETEROL XINAFOATE 1 DOSE(S): 230; 21 AEROSOL, METERED RESPIRATORY (INHALATION) at 05:48

## 2024-11-11 RX ADMIN — DONEPEZIL HYDROCHLORIDE 10 MILLIGRAM(S): 23 TABLET ORAL at 22:47

## 2024-11-11 RX ADMIN — Medication 125 MICROGRAM(S): at 05:48

## 2024-11-11 RX ADMIN — APIXABAN 2.5 MILLIGRAM(S): 5 TABLET, FILM COATED ORAL at 05:49

## 2024-11-11 RX ADMIN — SUCRALFATE 1 GRAM(S): 1 SUSPENSION ORAL at 17:02

## 2024-11-11 RX ADMIN — METHYLPREDNISOLONE ACETATE 20 MILLIGRAM(S): 80 INJECTION, SUSPENSION INTRALESIONAL; INTRAMUSCULAR; INTRASYNOVIAL; SOFT TISSUE at 05:48

## 2024-11-11 RX ADMIN — SUCRALFATE 1 GRAM(S): 1 SUSPENSION ORAL at 23:53

## 2024-11-11 RX ADMIN — MONTELUKAST SODIUM 10 MILLIGRAM(S): 10 TABLET, FILM COATED ORAL at 11:14

## 2024-11-11 RX ADMIN — Medication 60 MILLIGRAM(S): at 23:53

## 2024-11-11 RX ADMIN — IPRATROPIUM BROMIDE AND ALBUTEROL SULFATE 3 MILLILITER(S): .5; 2.5 SOLUTION RESPIRATORY (INHALATION) at 11:12

## 2024-11-11 RX ADMIN — Medication 60 MILLIGRAM(S): at 05:49

## 2024-11-11 RX ADMIN — SUCRALFATE 1 GRAM(S): 1 SUSPENSION ORAL at 11:13

## 2024-11-11 RX ADMIN — Medication 60 MILLIGRAM(S): at 17:02

## 2024-11-11 RX ADMIN — CLOPIDOGREL 75 MILLIGRAM(S): 75 TABLET ORAL at 11:14

## 2024-11-11 RX ADMIN — Medication 60 MILLIGRAM(S): at 11:13

## 2024-11-11 RX ADMIN — Medication 40 MILLIGRAM(S): at 22:43

## 2024-11-11 RX ADMIN — APIXABAN 2.5 MILLIGRAM(S): 5 TABLET, FILM COATED ORAL at 17:02

## 2024-11-11 RX ADMIN — ROFLUMILAST 250 MICROGRAM(S): 500 TABLET ORAL at 14:56

## 2024-11-12 ENCOUNTER — APPOINTMENT (OUTPATIENT)
Dept: NEUROSURGERY | Facility: CLINIC | Age: 79
End: 2024-11-12

## 2024-11-12 ENCOUNTER — TRANSCRIPTION ENCOUNTER (OUTPATIENT)
Age: 79
End: 2024-11-12

## 2024-11-12 VITALS
DIASTOLIC BLOOD PRESSURE: 75 MMHG | RESPIRATION RATE: 18 BRPM | HEART RATE: 103 BPM | TEMPERATURE: 98 F | OXYGEN SATURATION: 96 % | SYSTOLIC BLOOD PRESSURE: 131 MMHG

## 2024-11-12 LAB
ANION GAP SERPL CALC-SCNC: 11 MMOL/L — SIGNIFICANT CHANGE UP (ref 5–17)
BUN SERPL-MCNC: 22 MG/DL — SIGNIFICANT CHANGE UP (ref 7–23)
CALCIUM SERPL-MCNC: 9 MG/DL — SIGNIFICANT CHANGE UP (ref 8.4–10.5)
CHLORIDE SERPL-SCNC: 96 MMOL/L — SIGNIFICANT CHANGE UP (ref 96–108)
CO2 SERPL-SCNC: 23 MMOL/L — SIGNIFICANT CHANGE UP (ref 22–31)
CREAT SERPL-MCNC: 0.78 MG/DL — SIGNIFICANT CHANGE UP (ref 0.5–1.3)
EGFR: 76 ML/MIN/1.73M2 — SIGNIFICANT CHANGE UP
GLUCOSE SERPL-MCNC: 111 MG/DL — HIGH (ref 70–99)
POTASSIUM SERPL-MCNC: 3.5 MMOL/L — SIGNIFICANT CHANGE UP (ref 3.5–5.3)
POTASSIUM SERPL-SCNC: 3.5 MMOL/L — SIGNIFICANT CHANGE UP (ref 3.5–5.3)
SODIUM SERPL-SCNC: 130 MMOL/L — LOW (ref 135–145)

## 2024-11-12 RX ORDER — BUDESONIDE, GLYCOPYRROLATE, AND FORMOTEROL FUMARATE 160; 9; 4.8 UG/1; UG/1; UG/1
1 AEROSOL, METERED RESPIRATORY (INHALATION)
Qty: 0 | Refills: 0 | DISCHARGE

## 2024-11-12 RX ORDER — DIGOXIN 250 MCG
1 TABLET ORAL
Qty: 0 | Refills: 0 | DISCHARGE
Start: 2024-11-12

## 2024-11-12 RX ORDER — IPRATROPIUM BROMIDE AND ALBUTEROL SULFATE .5; 2.5 MG/3ML; MG/3ML
3 SOLUTION RESPIRATORY (INHALATION)
Qty: 0 | Refills: 0 | DISCHARGE
Start: 2024-11-12

## 2024-11-12 RX ADMIN — Medication 40 MILLIGRAM(S): at 05:55

## 2024-11-12 RX ADMIN — APIXABAN 2.5 MILLIGRAM(S): 5 TABLET, FILM COATED ORAL at 17:28

## 2024-11-12 RX ADMIN — SUCRALFATE 1 GRAM(S): 1 SUSPENSION ORAL at 12:19

## 2024-11-12 RX ADMIN — MONTELUKAST SODIUM 10 MILLIGRAM(S): 10 TABLET, FILM COATED ORAL at 12:23

## 2024-11-12 RX ADMIN — CLOPIDOGREL 75 MILLIGRAM(S): 75 TABLET ORAL at 12:21

## 2024-11-12 RX ADMIN — APIXABAN 2.5 MILLIGRAM(S): 5 TABLET, FILM COATED ORAL at 05:55

## 2024-11-12 RX ADMIN — SUCRALFATE 1 GRAM(S): 1 SUSPENSION ORAL at 05:55

## 2024-11-12 RX ADMIN — ROFLUMILAST 250 MICROGRAM(S): 500 TABLET ORAL at 12:19

## 2024-11-12 RX ADMIN — SUCRALFATE 1 GRAM(S): 1 SUSPENSION ORAL at 17:29

## 2024-11-12 RX ADMIN — Medication 125 MICROGRAM(S): at 05:55

## 2024-11-12 RX ADMIN — Medication 60 MILLIGRAM(S): at 17:28

## 2024-11-12 RX ADMIN — Medication 60 MILLIGRAM(S): at 12:20

## 2024-11-12 RX ADMIN — Medication 60 MILLIGRAM(S): at 05:55

## 2024-11-12 NOTE — PROGRESS NOTE ADULT - ASSESSMENT
79-year-old female with past medical history of COPD/Asthma, CAD status post 3 stents on aspirin, atrial fibrillation on Eliquis, GERD, presenting to the ED for evaluation of worsening dyspnea on exertion over the past several months.  Patient symptoms began 2 years ago and have progressively been worsening over the past 7 months.  Patient has seen pulmonologist as well as cardiologist with no significant improvement in her symptoms.  Patient is currently finishing a course of steroids with no improvement.  Patient reports her dyspnea on exertion exacerbates when talking, walking, eating, and is alleviated by rest.  They report the patient also has a pulse oximetry at home that is 99 to 100% at rest and drops to 86% when she is walking.  Patient is not on any home oxygen.  She denies any chest pain, cough, fever, chills, nausea, vomiting, diarrhea, abdominal pain, dysuria, hematuria, flank pain, headache, and any additional complaints at this time.  No recent travel or sick contacts.   (04 Nov 2024 16:36)  she says she has asthma and copd and she is taking brezteri at home:   currently she came in to hospital with increasing SOB:     SOB/JIMENES:  CArds/ S/P PCI :  A fibrillation  GERD      JIMENES/copd/asthma  - now found to have rapid a fib   - cw duonebs  - will monitor   -ADD ADVAIR:  SHE TAKES BREZTGERIA A PEÑA:    -FOLLOW UP NEW ECHO : JUST DONE:    -SHE HAS NOT BEEN WHEEZING MUCH  : BUT IF HER SOB DOES NOT IMPROVE  WILL GIVE A TRIAL OF IV STEROIDS   -CT CHEST NON CONTRAST      11/6  ct chest noted:  showed:   Mild bronchiectasis in the right middle lobe. No pneumonia.  -cont BD :  she has not been wheezing    11/7:  sheis still feeling sob:   will start empiric steroids:  she remans on room air:  dw son and daughter     11/8: she hqs not gotten any relief yet for the sob:  cont high dose steroids for ow:  most of the sob is on exertion:  has poor air entry     Afib with RVR  - telemonitor  - started cardiazem   - restart eliquis   -DEFER TO CARDIOLOGY    11/6:  she is back on eliquis   11/7: hr seems to be controlled : cont eliquis  11/8: cont eliquis     CAD  - cw asa  - FOLLOW UP NEW ECHO : PRLIM ECHO DID SHOW REDUCED LV FUNCTION   11/6: for stress testing today   11/7: stress testing noted:  defer to p r i [facility]ak[facility] team      GERD  - cw carafate      DW ACP 
79-year-old female with past medical history of COPD/Asthma, CAD status post 3 stents on aspirin, atrial fibrillation on Eliquis, GERD, presenting to the ED for evaluation of worsening dyspnea on exertion over the past several months.  Patient symptoms began 2 years ago and have progressively been worsening over the past 7 months.  Patient has seen pulmonologist as well as cardiologist with no significant improvement in her symptoms.  Patient is currently finishing a course of steroids with no improvement.  Patient reports her dyspnea on exertion exacerbates when talking, walking, eating, and is alleviated by rest.  They report the patient also has a pulse oximetry at home that is 99 to 100% at rest and drops to 86% when she is walking.  Patient is not on any home oxygen.  She denies any chest pain, cough, fever, chills, nausea, vomiting, diarrhea, abdominal pain, dysuria, hematuria, flank pain, headache, and any additional complaints at this time.  No recent travel or sick contacts.   (04 Nov 2024 16:36)  she says she has asthma and copd and she is taking brezteri at home:   currently she came in to hospital with increasing SOB:     SOB/JIMENES:  CArds/ S/P PCI :  A fibrillation  GERD      JIMENES/copd/asthma  - now found to have rapid a fib   - cw duonebs  - will monitor   -ADD ADVAIR:  SHE TAKES BREZTGERIA A PEÑA:    -FOLLOW UP NEW ECHO : JUST DONE:    -SHE HAS NOT BEEN WHEEZING MUCH  : BUT IF HER SOB DOES NOT IMPROVE  WILL GIVE A TRIAL OF IV STEROIDS   -CT CHEST NON CONTRAST      11/6  ct chest noted:  showed:   Mild bronchiectasis in the right middle lobe. No pneumonia.  -cont BD :  she has not been wheezing    11/7:  sheis still feeling sob:   will start empiric steroids:  she remans on room air:  dw son and daughter     Afib with RVR  - telemonitor  - started cardiazem   - restart eliquis   -DEFER TO CARDIOLOGY    11/6:  she is back on eliquis   11/7: hr seems to be controlld : cont eliquis    CAD  - cw asa  - FOLLOW UP NEW ECHO : PRLIM ECHO DID SHOW REDUCED LV FUNCTION   11/6: for stress testing today   11/7: stress testing noted:  defer to p r i camelia cards team      GERD  - cw carafate      DW ACP 
79-year-old female with past medical history of COPD, CAD status post 3 stents on aspirin, atrial fibrillation on Eliquis, GERD, presenting to the ED for evaluation of worsening dyspnea on exertion over the past several months.  Patient symptoms began 2 years ago and have progressively been worsening over the past 7 months.  Patient has seen pulmonologist as well as cardiologist with no significant improvement in her symptoms.  Patient is currently finishing a course of steroids with no improvement.  Patient reports her dyspnea on exertion exacerbates when talking, walking, eating, and is alleviated by rest.  They report the patient also has a pulse oximetry at home that is 99 to 100% at rest and drops to 86% when she is walking.  Patient is not on any home oxygen.  She denies any chest pain, cough, fever, chills, nausea, vomiting, diarrhea, abdominal pain, dysuria, hematuria, flank pain, headache, and any additional complaints at this time.  No recent travel or sick contacts.    JIMENES  - likely COPD exacerbation   - also  found to have rapid a fib  - cards and pulm fu   - cw duonebs  - will monitor   -  stress test neg    Afib with RVR  - telemonitor  - cw  Cardizem   - cw Eliquis     CAD  - cw asa  - cards fu     GERD  - cw Carafate    PT and rehab planning   
79-year-old female with past medical history of COPD/Asthma, CAD status post 3 stents on aspirin, atrial fibrillation on Eliquis, GERD, presenting to the ED for evaluation of worsening dyspnea on exertion over the past several months.  Patient symptoms began 2 years ago and have progressively been worsening over the past 7 months.  Patient has seen pulmonologist as well as cardiologist with no significant improvement in her symptoms.  Patient is currently finishing a course of steroids with no improvement.  Patient reports her dyspnea on exertion exacerbates when talking, walking, eating, and is alleviated by rest.  They report the patient also has a pulse oximetry at home that is 99 to 100% at rest and drops to 86% when she is walking.  Patient is not on any home oxygen.  She denies any chest pain, cough, fever, chills, nausea, vomiting, diarrhea, abdominal pain, dysuria, hematuria, flank pain, headache, and any additional complaints at this time.  No recent travel or sick contacts.   (04 Nov 2024 16:36)  she says she has asthma and copd and she is taking brezteri at home:   currently she came in to hospital with increasing SOB:     SOB/JIMENES:  CArds/ S/P PCI :  A fibrillation  GERD      JIMENES/copd/asthma  - now found to have rapid a fib   - cw duonebs  - will monitor   -ADD ADVAIR:  SHE TAKES BREZTGERIA A PEÑA:    -FOLLOW UP NEW ECHO : JUST DONE:    -SHE HAS NOT BEEN WHEEZING MUCH  : BUT IF HER SOB DOES NOT IMPROVE  WILL GIVE A TRIAL OF IV STEROIDS   -CT CHEST NON CONTRAST      11/6  ct chest noted:  showed:   Mild bronchiectasis in the right middle lobe. No pneumonia.  -cont BD :  she has not been wheezing    11/7:  sheis still feeling sob:   will start empiric steroids:  she remans on room air:  dw son and daughter     11/8: she hqs not gotten any relief yet for the sob:  cont high dose steroids for ow:  most of the sob is on exertion:  has poor air entry     11/9: finally she says she has started feeling better:  steroids need to be contd:   decrease dosages today : cont BD   11/10: cont to feel better:   cont steroids:  : and can change to po on dc for a few days:     Afib with RVR  - telemonitor  - started cardiazem   - restart eliquis   -DEFER TO CARDIOLOGY    11/6:  she is back on eliquis   11/7: hr seems to be controlled : cont eliquis  11/8: cont eliquis   11/9: hr controlled:  cont eliquis   11/10: cont ac:     CAD  - cw asa  - FOLLOW UP NEW ECHO : PRLIM ECHO DID SHOW REDUCED LV FUNCTION   11/6: for stress testing today   11/7: stress testing noted:  defer to p r i [facility] cards team    11/9: per cards   11/10: seems to be doing  ok :     GERD  - cw carafate      DW ACP 
79-year-old female with past medical history of COPD/Asthma, CAD status post 3 stents on aspirin, atrial fibrillation on Eliquis, GERD, presenting to the ED for evaluation of worsening dyspnea on exertion over the past several months.  Patient symptoms began 2 years ago and have progressively been worsening over the past 7 months.  Patient has seen pulmonologist as well as cardiologist with no significant improvement in her symptoms.  Patient is currently finishing a course of steroids with no improvement.  Patient reports her dyspnea on exertion exacerbates when talking, walking, eating, and is alleviated by rest.  They report the patient also has a pulse oximetry at home that is 99 to 100% at rest and drops to 86% when she is walking.  Patient is not on any home oxygen.  She denies any chest pain, cough, fever, chills, nausea, vomiting, diarrhea, abdominal pain, dysuria, hematuria, flank pain, headache, and any additional complaints at this time.  No recent travel or sick contacts.   (04 Nov 2024 16:36)  she says she has asthma and copd and she is taking brezteri at home:   currently she came in to hospital with increasing SOB:     SOB/JIMENES:  CArds/ S/P PCI :  A fibrillation  GERD      JIMENES/copd/asthma  - now found to have rapid a fib   - cw duonebs  - will monitor   -ADD ADVAIR:  SHE TAKES BREZTGERIA A PEÑA:    -FOLLOW UP NEW ECHO : JUST DONE:    -SHE HAS NOT BEEN WHEEZING MUCH  : BUT IF HER SOB DOES NOT IMPROVE  WILL GIVE A TRIAL OF IV STEROIDS   -CT CHEST NON CONTRAST      11/6  ct chest noted:  showed:   Mild bronchiectasis in the right middle lobe. No pneumonia.  -cont BD :  she has not been wheezing    Afib with RVR  - telemonitor  - started cardiazem   - restart eliquis   -DEFER TO CARDIOLOGY    11/6:  she is back on eliquis     CAD  - cw asa  - FOLLOW UP NEW ECHO : PRLIM ECHO DID SHOW REDUCED LV FUNCTION   11/6: for stress testing today       GERD  - cw carafate      DW ACP 
Overall, Hypoxia, abnormal UA  - Monitor off abx for now  - Monitor for signs/symptoms UTI, if present start treatment for UTI  - Workup for hypoxia per team  - Monitor for fevers/leukocytosis    Signing off. Please call [phone] or on call fellow with further questions or change in status.       Data/Rationale:  80 yo F with COPD, CAD, GERD with Ortiz  No fever, prior leukocytosis  Here with SOB, undergoing workup  ID called for leukocytosis, no resolved  UA neg/equivocal, UCX contam  No symptoms UTI  No other symptoms infection, no signs pneumonia  Low suspicion infection, appears well at bedside    Vaibhav Alvarado MD  Contact on TEAMS messaging from 9am - 5pm  From 5pm-9am, on weekends, or if no response call [phone]
SOB  likely due to copd  stress test shows no ischemia     PAF  HR much better  change cardizem infusion to 60 mg PO QID  cont digoxin  a/c    HTN  stable     CAD  s/p PCI x 5  on plavix  on statin     
SOB  likely due to copd  stress test shows no ischemia     PAF  HR much better  cont cardizem   cont digoxin   a/c    HTN  stable     CAD  s/p PCI x 5  on plavix  on statin     
SOB  likely due to copd  stress test shows no ischemia     PAF  HR much better  cont cardizem   cont digoxin   a/c    HTN  stable     CAD  s/p PCI x 5  on plavix  on statin     
SOB  likely due to copd  stress test shows no ischemia     PAF  cont cardizem   on a/c     HTN  stable     CAD  s/p PCI x 5  on plavix  on statin     
79-year-old female with past medical history of COPD, CAD status post 3 stents on aspirin, atrial fibrillation on Eliquis, GERD, presenting to the ED for evaluation of worsening dyspnea on exertion over the past several months.  Patient symptoms began 2 years ago and have progressively been worsening over the past 7 months.  Patient has seen pulmonologist as well as cardiologist with no significant improvement in her symptoms.  Patient is currently finishing a course of steroids with no improvement.  Patient reports her dyspnea on exertion exacerbates when talking, walking, eating, and is alleviated by rest.  They report the patient also has a pulse oximetry at home that is 99 to 100% at rest and drops to 86% when she is walking.  Patient is not on any home oxygen.  She denies any chest pain, cough, fever, chills, nausea, vomiting, diarrhea, abdominal pain, dysuria, hematuria, flank pain, headache, and any additional complaints at this time.  No recent travel or sick contacts.    JIMENES  - likely COPD exacerbation   - also  found to have rapid a fib  - cards and pulm fu   - cw duonebs  - will monitor   -  stress test neg    Afib with RVR  - telemonitor  - cw  Cardizem   - cw Eliquis     CAD- cw asa  - cards fu     GERD  - cw Carafate    PT and rehab planning       
79-year-old female with past medical history of COPD, CAD status post 3 stents on aspirin, atrial fibrillation on Eliquis, GERD, presenting to the ED for evaluation of worsening dyspnea on exertion over the past several months.  Patient symptoms began 2 years ago and have progressively been worsening over the past 7 months.  Patient has seen pulmonologist as well as cardiologist with no significant improvement in her symptoms.  Patient is currently finishing a course of steroids with no improvement.  Patient reports her dyspnea on exertion exacerbates when talking, walking, eating, and is alleviated by rest.  They report the patient also has a pulse oximetry at home that is 99 to 100% at rest and drops to 86% when she is walking.  Patient is not on any home oxygen.  She denies any chest pain, cough, fever, chills, nausea, vomiting, diarrhea, abdominal pain, dysuria, hematuria, flank pain, headache, and any additional complaints at this time.  No recent travel or sick contacts.    JIMENES  - likely COPD exacerbation   - also  found to have rapid a fib  - cards and pulm fu   - cw duonebs  - will monitor   -  stress test neg    Afib with RVR  - telemonitor  - cw  cardiazem - cw eliquis     CAD- cw asa  - cards fu     GERD  - cw carafate    case dw family at length   
79-year-old female with past medical history of COPD, CAD status post 3 stents on aspirin, atrial fibrillation on Eliquis, GERD, presenting to the ED for evaluation of worsening dyspnea on exertion over the past several months.  Patient symptoms began 2 years ago and have progressively been worsening over the past 7 months.  Patient has seen pulmonologist as well as cardiologist with no significant improvement in her symptoms.  Patient is currently finishing a course of steroids with no improvement.  Patient reports her dyspnea on exertion exacerbates when talking, walking, eating, and is alleviated by rest.  They report the patient also has a pulse oximetry at home that is 99 to 100% at rest and drops to 86% when she is walking.  Patient is not on any home oxygen.  She denies any chest pain, cough, fever, chills, nausea, vomiting, diarrhea, abdominal pain, dysuria, hematuria, flank pain, headache, and any additional complaints at this time.  No recent travel or sick contacts.    JIMENES  - now found to have rapid a fib  - cards and pulm fu   - cw duonebs  - will monitor   - check stress test     Afib with RVR  - telemonitor  - cw  cardiazem   - cw eliquis     CAD- cw asa  - cards fu     GERD  - cw carafate      case dw family at length 
79-year-old female with past medical history of COPD/Asthma, CAD status post 3 stents on aspirin, atrial fibrillation on Eliquis, GERD, presenting to the ED for evaluation of worsening dyspnea on exertion over the past several months.  Patient symptoms began 2 years ago and have progressively been worsening over the past 7 months.  Patient has seen pulmonologist as well as cardiologist with no significant improvement in her symptoms.  Patient is currently finishing a course of steroids with no improvement.  Patient reports her dyspnea on exertion exacerbates when talking, walking, eating, and is alleviated by rest.  They report the patient also has a pulse oximetry at home that is 99 to 100% at rest and drops to 86% when she is walking.  Patient is not on any home oxygen.  She denies any chest pain, cough, fever, chills, nausea, vomiting, diarrhea, abdominal pain, dysuria, hematuria, flank pain, headache, and any additional complaints at this time.  No recent travel or sick contacts.   (04 Nov 2024 16:36)  she says she has asthma and copd and she is taking brezteri at home:   currently she came in to hospital with increasing SOB:     SOB/JIMENES:  CArds/ S/P PCI :  A fibrillation  GERD      JIMENES/copd/asthma  - now found to have rapid a fib   - cw duonebs  - will monitor   -ADD ADVAIR:  SHE TAKES BREZTGERIA A PEÑA:    -FOLLOW UP NEW ECHO : JUST DONE:    -SHE HAS NOT BEEN WHEEZING MUCH  : BUT IF HER SOB DOES NOT IMPROVE  WILL GIVE A TRIAL OF IV STEROIDS   -CT CHEST NON CONTRAST      11/6  ct chest noted:  showed:   Mild bronchiectasis in the right middle lobe. No pneumonia.  -cont BD :  she has not been wheezing    11/7:  sheis still feeling sob:   will start empiric steroids:  she remans on room air:  dw son and daughter     11/8: she hqs not gotten any relief yet for the sob:  cont high dose steroids for ow:  most of the sob is on exertion:  has poor air entry     11/9: finally she says she has started feeling better:  steroids need to be contd:   decrease dosages today : cont BD   11/10: cont to feel better:   cont steroids:  : and can change to po on dc for a few days:     11/11: seems to be doing  ok : no sob:  no cough : no phlegm   -change to po prednisone  finally she says she is breathing better     11/12:  shei son home copd meds:  now:  brezteri,  roflumilast,  and BD:  she is on room air:  on prednisone     Afib with RVR  - telemonitor  - started cardiazem   - restart eliquis   -DEFER TO CARDIOLOGY    11/6:  she is back on eliquis   11/7: hr seems to be controlled : cont eliquis  11/8: cont eliquis   11/9: hr controlled:  cont eliquis   11/10: cont ac:   11/11: stable:    11/12": seems to be doing good:     CAD  - cw asa  - FOLLOW UP NEW ECHO : PRLIM ECHO DID SHOW REDUCED LV FUNCTION   11/6: for stress testing today   11/7: stress testing noted:  defer to p r i [facility] team    11/9: per cards   11/10: seems to be doing  ok :   11/12: stable:     GERD  - cw carafate      DW ACP 
Assessment/plan:    Onychomycosis secondary to dermatophytes––stable    Recommend continued routine podiatric nail care as an outpatient patient to follow-up with Dr. Dominguez at [phone] upon discharge.
SOB  likely due to copd  stress test shows no ischemia     PAF  in afib with RVR  change cardizem to infusion   start dig load  a/c    HTN  stable     CAD  s/p PCI x 5  on plavix  on statin     
79-year-old female with past medical history of COPD, CAD status post 3 stents on aspirin, atrial fibrillation on Eliquis, GERD, presenting to the ED for evaluation of worsening dyspnea on exertion over the past several months.  Patient symptoms began 2 years ago and have progressively been worsening over the past 7 months.  Patient has seen pulmonologist as well as cardiologist with no significant improvement in her symptoms.  Patient is currently finishing a course of steroids with no improvement.  Patient reports her dyspnea on exertion exacerbates when talking, walking, eating, and is alleviated by rest.  They report the patient also has a pulse oximetry at home that is 99 to 100% at rest and drops to 86% when she is walking.  Patient is not on any home oxygen.  She denies any chest pain, cough, fever, chills, nausea, vomiting, diarrhea, abdominal pain, dysuria, hematuria, flank pain, headache, and any additional complaints at this time.  No recent travel or sick contacts.    JIMENES  - likely COPD exacerbation   - cards and pulm fu   - cw duonebs  - will monitor   -  stress test neg    Afib with RVR  - telemonitor  - cw  Cardizem   - cw Eliquis     CAD  - cw asa  - cards fu     GERD  - cw Carafate    PT and rehab planning     
79-year-old female with past medical history of COPD, CAD status post 3 stents on aspirin, atrial fibrillation on Eliquis, GERD, presenting to the ED for evaluation of worsening dyspnea on exertion over the past several months.  Patient symptoms began 2 years ago and have progressively been worsening over the past 7 months.  Patient has seen pulmonologist as well as cardiologist with no significant improvement in her symptoms.  Patient is currently finishing a course of steroids with no improvement.  Patient reports her dyspnea on exertion exacerbates when talking, walking, eating, and is alleviated by rest.  They report the patient also has a pulse oximetry at home that is 99 to 100% at rest and drops to 86% when she is walking.  Patient is not on any home oxygen.  She denies any chest pain, cough, fever, chills, nausea, vomiting, diarrhea, abdominal pain, dysuria, hematuria, flank pain, headache, and any additional complaints at this time.  No recent travel or sick contacts.    JIMENES  - now found to have rapid a fib  - cards and pulm fu   - cw duonebs  - will monitor     Afib with RVR  - telemonitor  - started cardiazem   - restart eliquis     CAD  - cw asa  - cards fu       GERD  - cw carafate  
79-year-old female with past medical history of COPD/Asthma, CAD status post 3 stents on aspirin, atrial fibrillation on Eliquis, GERD, presenting to the ED for evaluation of worsening dyspnea on exertion over the past several months.  Patient symptoms began 2 years ago and have progressively been worsening over the past 7 months.  Patient has seen pulmonologist as well as cardiologist with no significant improvement in her symptoms.  Patient is currently finishing a course of steroids with no improvement.  Patient reports her dyspnea on exertion exacerbates when talking, walking, eating, and is alleviated by rest.  They report the patient also has a pulse oximetry at home that is 99 to 100% at rest and drops to 86% when she is walking.  Patient is not on any home oxygen.  She denies any chest pain, cough, fever, chills, nausea, vomiting, diarrhea, abdominal pain, dysuria, hematuria, flank pain, headache, and any additional complaints at this time.  No recent travel or sick contacts.   (04 Nov 2024 16:36)  she says she has asthma and copd and she is taking brezteri at home:   currently she came in to hospital with increasing SOB:     SOB/JIMENES:  CArds/ S/P PCI :  A fibrillation  GERD      JIMENES/copd/asthma  - now found to have rapid a fib   - cw duonebs  - will monitor   -ADD ADVAIR:  SHE TAKES BREZTGERIA A PEÑA:    -FOLLOW UP NEW ECHO : JUST DONE:    -SHE HAS NOT BEEN WHEEZING MUCH  : BUT IF HER SOB DOES NOT IMPROVE  WILL GIVE A TRIAL OF IV STEROIDS   -CT CHEST NON CONTRAST      11/6  ct chest noted:  showed:   Mild bronchiectasis in the right middle lobe. No pneumonia.  -cont BD :  she has not been wheezing    11/7:  sheis still feeling sob:   will start empiric steroids:  she remans on room air:  dw son and daughter     11/8: she hqs not gotten any relief yet for the sob:  cont high dose steroids for ow:  most of the sob is on exertion:  has poor air entry     11/9: finally she says she has started feelin gbetter:  steroids need to be contd:   decrease dosages today : cont BD     Afib with RVR  - telemonitor  - started cardiazem   - restart eliquis   -DEFER TO CARDIOLOGY    11/6:  she is back on eliquis   11/7: hr seems to be controlled : cont eliquis  11/8: cont eliquis   11/9: hr controlled:  cont eliquis     CAD  - cw asa  - FOLLOW UP NEW ECHO : PRLIM ECHO DID SHOW REDUCED LV FUNCTION   11/6: for stress testing today   11/7: stress testing noted:  defer to p r i [facility] team    11/9: per cards     GERD  - cw carafate      DW ACP 
79-year-old female with past medical history of COPD/Asthma, CAD status post 3 stents on aspirin, atrial fibrillation on Eliquis, GERD, presenting to the ED for evaluation of worsening dyspnea on exertion over the past several months.  Patient symptoms began 2 years ago and have progressively been worsening over the past 7 months.  Patient has seen pulmonologist as well as cardiologist with no significant improvement in her symptoms.  Patient is currently finishing a course of steroids with no improvement.  Patient reports her dyspnea on exertion exacerbates when talking, walking, eating, and is alleviated by rest.  They report the patient also has a pulse oximetry at home that is 99 to 100% at rest and drops to 86% when she is walking.  Patient is not on any home oxygen.  She denies any chest pain, cough, fever, chills, nausea, vomiting, diarrhea, abdominal pain, dysuria, hematuria, flank pain, headache, and any additional complaints at this time.  No recent travel or sick contacts.   (04 Nov 2024 16:36)  she says she has asthma and copd and she is taking brezteri at home:   currently she came in to hospital with increasing SOB:     SOB/JIMENES:  CArds/ S/P PCI :  A fibrillation  GERD      JIMENES/copd/asthma  - now found to have rapid a fib   - cw duonebs  - will monitor   -ADD ADVAIR:  SHE TAKES BREZTGERIA A PEÑA:    -FOLLOW UP NEW ECHO : JUST DONE:    -SHE HAS NOT BEEN WHEEZING MUCH  : BUT IF HER SOB DOES NOT IMPROVE  WILL GIVE A TRIAL OF IV STEROIDS   -CT CHEST NON CONTRAST      11/6  ct chest noted:  showed:   Mild bronchiectasis in the right middle lobe. No pneumonia.  -cont BD :  she has not been wheezing    11/7:  sheis still feeling sob:   will start empiric steroids:  she remans on room air:  dw son and daughter     11/8: she hqs not gotten any relief yet for the sob:  cont high dose steroids for ow:  most of the sob is on exertion:  has poor air entry     11/9: finally she says she has started feeling better:  steroids need to be contd:   decrease dosages today : cont BD   11/10: cont to feel better:   cont steroids:  : and can change to po on dc for a few days:     11/11: seems to be doing  ok : no sob:  no cough : no phlegm   -change to po prednisone  finally she says she is breathing better     Afib with RVR  - telemonitor  - started cardiazem   - restart eliquis   -DEFER TO CARDIOLOGY    11/6:  she is back on eliquis   11/7: hr seems to be controlled : cont eliquis  11/8: cont eliquis   11/9: hr controlled:  cont eliquis   11/10: cont ac:   11/11: stable:      CAD  - cw asa  - FOLLOW UP NEW ECHO : PRLIM ECHO DID SHOW REDUCED LV FUNCTION   11/6: for stress testing today   11/7: stress testing noted:  defer to [facility] cards team    11/9: per cards   11/10: seems to be doing  ok :     GERD  - cw carafate      DW ACP 
SOB  chronic over a year   has had PCI less than a year ago but she states no improvement   ? due to rapid afib  copd  fu with pulm   echo reviewed   now in sinus  will get stress test     chronic afib   in sinus now   cont current meds  on a/c     HTN  stable     CAD  s/p PCI x 5  on plavix  on statin   as above      Advanced care planning was discussed with patient and family.  Risks, benefits and alternatives of the cardiac treatments and medical therapy including procedures were discussed in detail and all questions were answered. Importance of compliance with medical therapy and lifestyle modification to improve cardiovascular health were addressed. Appropriate forms and patient educational materials were reviewed. 30 minutes face to face spent.  
SOB  likely due to copd  stress test shows no ischemia     PAF  HR much better  change cardizem infusion to 60 mg PO QID  cont digoxin, check digoxin level   a/c    HTN  stable     CAD  s/p PCI x 5  on plavix  on statin     
79-year-old female with past medical history of COPD, CAD status post 3 stents on aspirin, atrial fibrillation on Eliquis, GERD, presenting to the ED for evaluation of worsening dyspnea on exertion over the past several months.  Patient symptoms began 2 years ago and have progressively been worsening over the past 7 months.  Patient has seen pulmonologist as well as cardiologist with no significant improvement in her symptoms.  Patient is currently finishing a course of steroids with no improvement.  Patient reports her dyspnea on exertion exacerbates when talking, walking, eating, and is alleviated by rest.  They report the patient also has a pulse oximetry at home that is 99 to 100% at rest and drops to 86% when she is walking.  Patient is not on any home oxygen.  She denies any chest pain, cough, fever, chills, nausea, vomiting, diarrhea, abdominal pain, dysuria, hematuria, flank pain, headache, and any additional complaints at this time.  No recent travel or sick contacts.    JIMENES  - likely COPD exacerbation   - also  found to have rapid a fib  - cards and pulm fu   - cw duonebs  - will monitor   -  stress test neg    Afib with RVR  - telemonitor  - cw  cardiazem   - cw eliquis     CAD- cw asa  - cards fu     GERD  - cw carafate    case dw family at length

## 2024-11-12 NOTE — PROGRESS NOTE ADULT - REASON FOR ADMISSION
JIMENES

## 2024-11-12 NOTE — PROGRESS NOTE ADULT - PROVIDER SPECIALTY LIST ADULT
Cardiology
Hospitalist
Hospitalist
Pulmonology
Cardiology
Hospitalist
Pulmonology
Cardiology
Hospitalist
Hospitalist
Infectious Disease
Pulmonology
Hospitalist
Hospitalist
Podiatry
Pulmonology
Pulmonology

## 2024-11-12 NOTE — PROGRESS NOTE ADULT - SUBJECTIVE AND OBJECTIVE BOX
Subjective: Patient seen and examined. No new events except as noted.     SUBJECTIVE/ROS:  nad      MEDICATIONS:  MEDICATIONS  (STANDING):  albuterol/ipratropium for Nebulization 3 milliLiter(s) Nebulizer every 6 hours  apixaban 2.5 milliGRAM(s) Oral every 12 hours  atorvastatin 40 milliGRAM(s) Oral at bedtime  clopidogrel Tablet 75 milliGRAM(s) Oral daily  digoxin     Tablet 125 MICROGram(s) Oral daily  digoxin  Injectable 250 MICROGram(s) IV Push once  diltiazem Infusion 10 mG/Hr (10 mL/Hr) IV Continuous <Continuous>  donepezil 10 milliGRAM(s) Oral at bedtime  fluticasone propionate/ salmeterol 250-50 MICROgram(s) Diskus 1 Dose(s) Inhalation two times a day  methylPREDNISolone sodium succinate Injectable 20 milliGRAM(s) IV Push every 12 hours  montelukast 10 milliGRAM(s) Oral daily  sucralfate 1 Gram(s) Oral four times a day      PHYSICAL EXAM:  T(C): 36.9 (11-10-24 @ 04:48), Max: 36.9 (11-10-24 @ 04:48)  HR: 81 (11-10-24 @ 04:48) (81 - 95)  BP: 113/60 (11-10-24 @ 04:48) (113/60 - 126/67)  RR: 18 (11-10-24 @ 04:48) (18 - 18)  SpO2: 98% (11-10-24 @ 04:48) (96% - 98%)  Wt(kg): --  I&O's Summary    08 Nov 2024 07:01  -  09 Nov 2024 07:00  --------------------------------------------------------  IN: 750 mL / OUT: 1350 mL / NET: -600 mL            JVP: Normal  Neck: supple  Lung: clear   CV: S1 S2 , Murmur:  Abd: soft  Ext: No edema  neuro: Awake / alert  Psych: flat affect  Skin: normal``    LABS/DATA:    CARDIAC MARKERS:            11-09    130[L]  |  98  |  20  ----------------------------<  166[H]  4.0   |  19[L]  |  0.96    Ca    9.6      09 Nov 2024 06:43  Phos  2.7     11-09  Mg     2.1     11-09      proBNP:   Lipid Profile:   HgA1c:   TSH:     TELE:  EKG:        
    Subjective: Patient seen and examined. No new events except as noted.     SUBJECTIVE/ROS:  nad      MEDICATIONS:  MEDICATIONS  (STANDING):  albuterol/ipratropium for Nebulization 3 milliLiter(s) Nebulizer every 6 hours  apixaban 2.5 milliGRAM(s) Oral every 12 hours  atorvastatin 40 milliGRAM(s) Oral at bedtime  clopidogrel Tablet 75 milliGRAM(s) Oral daily  diltiazem Infusion 10 mG/Hr (10 mL/Hr) IV Continuous <Continuous>  donepezil 10 milliGRAM(s) Oral at bedtime  fluticasone propionate/ salmeterol 250-50 MICROgram(s) Diskus 1 Dose(s) Inhalation two times a day  methylPREDNISolone sodium succinate Injectable 40 milliGRAM(s) IV Push every 8 hours  montelukast 10 milliGRAM(s) Oral daily  sucralfate 1 Gram(s) Oral four times a day      PHYSICAL EXAM:  T(C): 36.7 (11-08-24 @ 08:24), Max: 36.7 (11-08-24 @ 04:40)  HR: 98 (11-08-24 @ 08:24) (76 - 122)  BP: 125/68 (11-08-24 @ 08:24) (104/63 - 127/67)  RR: 18 (11-08-24 @ 08:24) (18 - 18)  SpO2: 95% (11-08-24 @ 08:24) (94% - 100%)  Wt(kg): --  I&O's Summary    07 Nov 2024 07:01  -  08 Nov 2024 07:00  --------------------------------------------------------  IN: 700 mL / OUT: 1150 mL / NET: -450 mL            JVP: Normal  Neck: supple  Lung: clear   CV: S1 S2 , Murmur:  Abd: soft  Ext: No edema  neuro: Awake / alert  Psych: flat affect  Skin: normal``    LABS/DATA:    CARDIAC MARKERS:            11-08    129[L]  |  96  |  18  ----------------------------<  176[H]  4.0   |  18[L]  |  0.81    Ca    9.1      08 Nov 2024 06:27      proBNP:   Lipid Profile:   HgA1c:   TSH:     TELE:  EKG:        
    Subjective: Patient seen and examined. No new events except as noted.     SUBJECTIVE/ROS:  nad      MEDICATIONS:  MEDICATIONS  (STANDING):  apixaban 2.5 milliGRAM(s) Oral every 12 hours  atorvastatin 40 milliGRAM(s) Oral at bedtime  Breztri inhaler (Budesonide, Glycopyrrolate, and Formoterol 1 Puff(s) 1 Puff(s) Inhalation two times a day  clopidogrel Tablet 75 milliGRAM(s) Oral daily  digoxin     Tablet 125 MICROGram(s) Oral daily  diltiazem    Tablet 60 milliGRAM(s) Oral four times a day  donepezil 10 milliGRAM(s) Oral at bedtime  montelukast 10 milliGRAM(s) Oral daily  predniSONE   Tablet 40 milliGRAM(s) Oral daily  roflumilast 250 MICROGram(s) Oral daily  sucralfate 1 Gram(s) Oral four times a day      PHYSICAL EXAM:  T(C): 36.7 (11-12-24 @ 04:51), Max: 36.8 (11-11-24 @ 17:06)  HR: 98 (11-12-24 @ 04:51) (81 - 98)  BP: 134/80 (11-12-24 @ 04:51) (127/62 - 150/82)  RR: 18 (11-12-24 @ 04:51) (16 - 18)  SpO2: 96% (11-12-24 @ 04:51) (96% - 98%)  Wt(kg): --  I&O's Summary    11 Nov 2024 07:01  -  12 Nov 2024 07:00  --------------------------------------------------------  IN: 760 mL / OUT: 2200 mL / NET: -1440 mL            JVP: Normal  Neck: supple  Lung: clear   CV: S1 S2 , Murmur:  Abd: soft  Ext: No edema  neuro: Awake / alert  Psych: flat affect  Skin: normal``    LABS/DATA:    CARDIAC MARKERS:                                11.8   12.33 )-----------( 278      ( 11 Nov 2024 06:44 )             35.3     11-12    130[L]  |  96  |  22  ----------------------------<  111[H]  3.5   |  23  |  0.78    Ca    9.0      12 Nov 2024 06:22      proBNP:   Lipid Profile:   HgA1c:   TSH:     TELE:  EKG:        
CC: F/U for positive UA    Saw/spoke to patient. No fevers, no chills. No new complaints.    Allergies  codeine (Rash; Urticaria; Hives)  sulfa drugs (Vomiting; Nausea)  atenolol (Hives)  omeprazole (Short breath; Urticaria)    ANTIMICROBIALS:      PE:    Vital Signs Last 24 Hrs  T(C): 36.4 (07 Nov 2024 11:43), Max: 37.1 (07 Nov 2024 04:54)  T(F): 97.5 (07 Nov 2024 11:43), Max: 98.7 (07 Nov 2024 04:54)  HR: 77 (07 Nov 2024 11:43) (77 - 103)  BP: 104/65 (07 Nov 2024 11:43) (104/65 - 131/69)  RR: 18 (07 Nov 2024 11:43) (18 - 18)  SpO2: 97% (07 Nov 2024 11:43) (97% - 99%)    Gen: AOx3, NAD, non-toxic  CV: Nontachycardic  Resp: Breathing comfortably, RA  Abd: Soft, nontender  IV/Skin: No thrombophlebitis    LABS:                        13.0   9.84  )-----------( 318      ( 06 Nov 2024 06:01 )             38.3     11-06    133[L]  |  99  |  19  ----------------------------<  101[H]  3.5   |  20[L]  |  0.94    Ca    9.2      06 Nov 2024 06:02    Urinalysis Basic - ( 06 Nov 2024 06:02 )    Color: x / Appearance: x / SG: x / pH: x  Gluc: 101 mg/dL / Ketone: x  / Bili: x / Urobili: x   Blood: x / Protein: x / Nitrite: x   Leuk Esterase: x / RBC: x / WBC x   Sq Epi: x / Non Sq Epi: x / Bacteria: x    MICROBIOLOGY:    Clean Catch Clean Catch (Midstream)  11-04-24   >=3 organisms. Probable collection contamination.  --  --    RADIOLOGY:    11/6    IMPRESSION:    Mild bronchiectasis in the right middle lobe.    No pneumonia.
Date of Service: 11-11-24 @ 15:52    Patient is a 79y old  Female who presents with a chief complaint of JIMENES (11 Nov 2024 08:31)      Any change in ROS: seems OK: no sob: no cough ;     MEDICATIONS  (STANDING):  apixaban 2.5 milliGRAM(s) Oral every 12 hours  atorvastatin 40 milliGRAM(s) Oral at bedtime  Breztri inhaler (Budesonide, Glycopyrrolate, and Formoterol 1 Puff(s)   Inhalation two times a day  clopidogrel Tablet 75 milliGRAM(s) Oral daily  digoxin     Tablet 125 MICROGram(s) Oral daily  diltiazem    Tablet 60 milliGRAM(s) Oral four times a day  donepezil 10 milliGRAM(s) Oral at bedtime  methylPREDNISolone sodium succinate Injectable 20 milliGRAM(s) IV Push every 12 hours  montelukast 10 milliGRAM(s) Oral daily  roflumilast 250 MICROGram(s) Oral daily  sucralfate 1 Gram(s) Oral four times a day    MEDICATIONS  (PRN):  acetaminophen     Tablet .. 650 milliGRAM(s) Oral every 6 hours PRN Mild Pain (1 - 3)  albuterol/ipratropium for Nebulization 3 milliLiter(s) Nebulizer every 6 hours PRN Shortness of Breath and/or Wheezing    Vital Signs Last 24 Hrs  T(C): 36.3 (11 Nov 2024 11:04), Max: 36.6 (11 Nov 2024 04:45)  T(F): 97.4 (11 Nov 2024 11:04), Max: 97.9 (11 Nov 2024 04:45)  HR: 78 (11 Nov 2024 11:04) (74 - 96)  BP: 119/53 (11 Nov 2024 11:04) (119/53 - 133/66)  BP(mean): --  RR: 18 (11 Nov 2024 11:04) (18 - 18)  SpO2: 98% (11 Nov 2024 11:04) (96% - 98%)    Parameters below as of 11 Nov 2024 11:04  Patient On (Oxygen Delivery Method): room air        I&O's Summary    10 Nov 2024 07:01  -  11 Nov 2024 07:00  --------------------------------------------------------  IN: 480 mL / OUT: 1200 mL / NET: -720 mL    11 Nov 2024 07:01  -  11 Nov 2024 15:52  --------------------------------------------------------  IN: 350 mL / OUT: 400 mL / NET: -50 mL          Physical Exam:   GENERAL: NAD, well-groomed, well-developed  HEENT: ANTHONY/   Atraumatic, Normocephalic  ENMT: No tonsillar erythema, exudates, or enlargement; Moist mucous membranes, Good dentition, No lesions  NECK: Supple, No JVD, Normal thyroid  CHEST/LUNG: Clear to auscultaion  CVS: Regular rate and rhythm; No murmurs, rubs, or gallops  GI: : Soft, Nontender, Nondistended; Bowel sounds present  NERVOUS SYSTEM:  Alert & Oriented X3  EXTREMITIES:  -edema  LYMPH: No lymphadenopathy noted  SKIN: No rashes or lesions  ENDOCRINOLOGY: No Thyromegaly  PSYCH: Appropriate    Labs:                              11.8   12.33 )-----------( 278      ( 11 Nov 2024 06:44 )             35.3                         11.7   15.72 )-----------( 276      ( 10 Nov 2024 06:34 )             33.5     11-11    129[L]  |  97  |  27[H]  ----------------------------<  145[H]  4.1   |  19[L]  |  0.80  11-10    129[L]  |  98  |  23  ----------------------------<  156[H]  3.9   |  19[L]  |  0.88  11-09    130[L]  |  98  |  20  ----------------------------<  166[H]  4.0   |  19[L]  |  0.96  11-08    129[L]  |  96  |  18  ----------------------------<  176[H]  4.0   |  18[L]  |  0.81    Ca    8.6      11 Nov 2024 06:44  Ca    9.0      10 Nov 2024 06:34  Phos  2.5     11-10  Mg     2.0     11-10      CAPILLARY BLOOD GLUCOSE        rad< from: CT Chest No Cont (11.06.24 @ 13:48) >    LYMPH NODES/MEDIASTINUM: No lymphadenopathy.    HEART/VASCULATURE: Normal sized heart and aorta. No pericardial effusion.   Coronary artery calcifications. Calcified aorta.    UPPER ABDOMEN: Calcification in the spleen which may be from old   granulomatous disease.    BONES/SOFT TISSUES: Unremarkable.      IMPRESSION:    Mild bronchiectasis in the right middle lobe.    No pneumonia.    --- End of Report ---            MARGARITA REYES M.D., ATTENDING RADIOLOGIST  This document has been electronically signed. Nov 6 2024  1:55PM    < end of copied text >        Urinalysis Basic - ( 11 Nov 2024 06:44 )    Color: x / Appearance: x / SG: x / pH: x  Gluc: 145 mg/dL / Ketone: x  / Bili: x / Urobili: x   Blood: x / Protein: x / Nitrite: x   Leuk Esterase: x / RBC: x / WBC x   Sq Epi: x / Non Sq Epi: x / Bacteria: x            RECENT CULTURES:        RESPIRATORY CULTURES:          Studies  Chest X-RAY  CT SCAN Chest   Venous Dopplers: LE:   CT Abdomen  Others              
Patient is a 79y old  Female who presents with a chief complaint of JIMENES (05 Nov 2024 10:00)    Date of servie : 11-05-24 @ 13:27  INTERVAL HPI/OVERNIGHT EVENTS:  T(C): 36.3 (11-05-24 @ 06:41), Max: 36.6 (11-05-24 @ 04:40)  HR: 135 (11-05-24 @ 06:41) (99 - 135)  BP: 134/83 (11-05-24 @ 06:41) (117/73 - 134/83)  RR: 18 (11-05-24 @ 06:41) (18 - 20)  SpO2: 95% (11-05-24 @ 06:41) (95% - 100%)  Wt(kg): --  I&O's Summary      LABS:                        13.4   12.23 )-----------( 336      ( 05 Nov 2024 06:28 )             40.7     11-05    131[L]  |  96  |  23  ----------------------------<  123[H]  3.9   |  19[L]  |  1.02    Ca    9.0      05 Nov 2024 06:28  Mg     2.2     11-04    TPro  6.6  /  Alb  4.1  /  TBili  0.3  /  DBili  x   /  AST  17  /  ALT  19  /  AlkPhos  50  11-04      Urinalysis Basic - ( 05 Nov 2024 06:28 )    Color: x / Appearance: x / SG: x / pH: x  Gluc: 123 mg/dL / Ketone: x  / Bili: x / Urobili: x   Blood: x / Protein: x / Nitrite: x   Leuk Esterase: x / RBC: x / WBC x   Sq Epi: x / Non Sq Epi: x / Bacteria: x      CAPILLARY BLOOD GLUCOSE            Urinalysis Basic - ( 05 Nov 2024 06:28 )    Color: x / Appearance: x / SG: x / pH: x  Gluc: 123 mg/dL / Ketone: x  / Bili: x / Urobili: x   Blood: x / Protein: x / Nitrite: x   Leuk Esterase: x / RBC: x / WBC x   Sq Epi: x / Non Sq Epi: x / Bacteria: x        MEDICATIONS  (STANDING):  albuterol/ipratropium for Nebulization 3 milliLiter(s) Nebulizer every 6 hours  atorvastatin 40 milliGRAM(s) Oral at bedtime  clopidogrel Tablet 75 milliGRAM(s) Oral daily  diltiazem    Tablet 60 milliGRAM(s) Oral four times a day  donepezil 10 milliGRAM(s) Oral at bedtime  montelukast 10 milliGRAM(s) Oral daily  sucralfate 1 Gram(s) Oral four times a day    MEDICATIONS  (PRN):  acetaminophen     Tablet .. 650 milliGRAM(s) Oral every 6 hours PRN Mild Pain (1 - 3)          PHYSICAL EXAM:  GENERAL: NAD, well-groomed, well-developed  HEAD:  Atraumatic, Normocephalic  CHEST/LUNG: Clear to percussion bilaterally; No rales, rhonchi, wheezing, or rubs  HEART: Regular rate and rhythm; No murmurs, rubs, or gallops  ABDOMEN: Soft, Nontender, Nondistended; Bowel sounds present  EXTREMITIES:  2+ Peripheral Pulses, No clubbing, cyanosis, or edema  LYMPH: No lymphadenopathy noted  SKIN: No rashes or lesions    Care Discussed with Consultants/Other Providers [ ] YES  [ ] NO
Patient is a 79y old  Female who presents with a chief complaint of JIMENES (09 Nov 2024 13:15)    Date of servie : 11-09-24 @ 17:03  INTERVAL HPI/OVERNIGHT EVENTS:  T(C): 36.4 (11-09-24 @ 16:51), Max: 37.1 (11-09-24 @ 04:49)  HR: 90 (11-09-24 @ 16:51) (73 - 95)  BP: 124/78 (11-09-24 @ 16:51) (101/64 - 124/78)  RR: 18 (11-09-24 @ 16:51) (18 - 18)  SpO2: 97% (11-09-24 @ 16:51) (96% - 97%)  Wt(kg): --  I&O's Summary    08 Nov 2024 07:01  -  09 Nov 2024 07:00  --------------------------------------------------------  IN: 750 mL / OUT: 1350 mL / NET: -600 mL        LABS:    11-09    130[L]  |  98  |  20  ----------------------------<  166[H]  4.0   |  19[L]  |  0.96    Ca    9.6      09 Nov 2024 06:43  Phos  2.7     11-09  Mg     2.1     11-09        Urinalysis Basic - ( 09 Nov 2024 06:43 )    Color: x / Appearance: x / SG: x / pH: x  Gluc: 166 mg/dL / Ketone: x  / Bili: x / Urobili: x   Blood: x / Protein: x / Nitrite: x   Leuk Esterase: x / RBC: x / WBC x   Sq Epi: x / Non Sq Epi: x / Bacteria: x      CAPILLARY BLOOD GLUCOSE            Urinalysis Basic - ( 09 Nov 2024 06:43 )    Color: x / Appearance: x / SG: x / pH: x  Gluc: 166 mg/dL / Ketone: x  / Bili: x / Urobili: x   Blood: x / Protein: x / Nitrite: x   Leuk Esterase: x / RBC: x / WBC x   Sq Epi: x / Non Sq Epi: x / Bacteria: x        MEDICATIONS  (STANDING):  albuterol/ipratropium for Nebulization 3 milliLiter(s) Nebulizer every 6 hours  apixaban 2.5 milliGRAM(s) Oral every 12 hours  atorvastatin 40 milliGRAM(s) Oral at bedtime  clopidogrel Tablet 75 milliGRAM(s) Oral daily  digoxin     Tablet 125 MICROGram(s) Oral daily  digoxin  Injectable 250 MICROGram(s) IV Push once  diltiazem Infusion 10 mG/Hr (10 mL/Hr) IV Continuous <Continuous>  donepezil 10 milliGRAM(s) Oral at bedtime  fluticasone propionate/ salmeterol 250-50 MICROgram(s) Diskus 1 Dose(s) Inhalation two times a day  methylPREDNISolone sodium succinate Injectable 20 milliGRAM(s) IV Push every 12 hours  montelukast 10 milliGRAM(s) Oral daily  sucralfate 1 Gram(s) Oral four times a day    MEDICATIONS  (PRN):  acetaminophen     Tablet .. 650 milliGRAM(s) Oral every 6 hours PRN Mild Pain (1 - 3)          PHYSICAL EXAM:  GENERAL: NAD, well-groomed, well-developed  HEAD:  Atraumatic, Normocephalic  CHEST/LUNG: Clear to percussion bilaterally; No rales, rhonchi, wheezing, or rubs  HEART: Regular rate and rhythm; No murmurs, rubs, or gallops  ABDOMEN: Soft, Nontender, Nondistended; Bowel sounds present  EXTREMITIES:  2+ Peripheral Pulses, No clubbing, cyanosis, or edema  LYMPH: No lymphadenopathy noted  SKIN: No rashes or lesions    Care Discussed with Consultants/Other Providers [ ] YES  [ ] NO
Patient is a 79y old  Female who presents with a chief complaint of JIMENES (11 Nov 2024 15:52)    Date of servie : 11-11-24 @ 17:49  INTERVAL HPI/OVERNIGHT EVENTS:  T(C): 36.8 (11-11-24 @ 17:06), Max: 36.8 (11-11-24 @ 17:06)  HR: 92 (11-11-24 @ 17:06) (74 - 96)  BP: 136/64 (11-11-24 @ 17:06) (119/53 - 136/64)  RR: 16 (11-11-24 @ 17:06) (16 - 18)  SpO2: 97% (11-11-24 @ 17:06) (96% - 98%)  Wt(kg): --  I&O's Summary    10 Nov 2024 07:01  -  11 Nov 2024 07:00  --------------------------------------------------------  IN: 480 mL / OUT: 1200 mL / NET: -720 mL    11 Nov 2024 07:01  -  11 Nov 2024 17:49  --------------------------------------------------------  IN: 350 mL / OUT: 400 mL / NET: -50 mL        LABS:                        11.8   12.33 )-----------( 278      ( 11 Nov 2024 06:44 )             35.3     11-11    129[L]  |  97  |  27[H]  ----------------------------<  145[H]  4.1   |  19[L]  |  0.80    Ca    8.6      11 Nov 2024 06:44  Phos  2.5     11-10  Mg     2.0     11-10        Urinalysis Basic - ( 11 Nov 2024 06:44 )    Color: x / Appearance: x / SG: x / pH: x  Gluc: 145 mg/dL / Ketone: x  / Bili: x / Urobili: x   Blood: x / Protein: x / Nitrite: x   Leuk Esterase: x / RBC: x / WBC x   Sq Epi: x / Non Sq Epi: x / Bacteria: x      CAPILLARY BLOOD GLUCOSE            Urinalysis Basic - ( 11 Nov 2024 06:44 )    Color: x / Appearance: x / SG: x / pH: x  Gluc: 145 mg/dL / Ketone: x  / Bili: x / Urobili: x   Blood: x / Protein: x / Nitrite: x   Leuk Esterase: x / RBC: x / WBC x   Sq Epi: x / Non Sq Epi: x / Bacteria: x        MEDICATIONS  (STANDING):  apixaban 2.5 milliGRAM(s) Oral every 12 hours  atorvastatin 40 milliGRAM(s) Oral at bedtime  Breztri inhaler (Budesonide, Glycopyrrolate, and Formoterol 1 Puff(s)   Inhalation two times a day  clopidogrel Tablet 75 milliGRAM(s) Oral daily  digoxin     Tablet 125 MICROGram(s) Oral daily  diltiazem    Tablet 60 milliGRAM(s) Oral four times a day  donepezil 10 milliGRAM(s) Oral at bedtime  montelukast 10 milliGRAM(s) Oral daily  roflumilast 250 MICROGram(s) Oral daily  sucralfate 1 Gram(s) Oral four times a day    MEDICATIONS  (PRN):  acetaminophen     Tablet .. 650 milliGRAM(s) Oral every 6 hours PRN Mild Pain (1 - 3)  albuterol/ipratropium for Nebulization 3 milliLiter(s) Nebulizer every 6 hours PRN Shortness of Breath and/or Wheezing          PHYSICAL EXAM:  GENERAL: Frail  HEAD:  Atraumatic, Normocephalic  CHEST/LUNG: wheezing +  HEART: Regular rate and rhythm; No murmurs, rubs, or gallops  ABDOMEN: Soft, Nontender, Nondistended; Bowel sounds present  EXTREMITIES:  2+ Peripheral Pulses, No clubbing, cyanosis, or edema  LYMPH: No lymphadenopathy noted  SKIN: No rashes or lesions    Care Discussed with Consultants/Other Providers [ ] YES  [ ] NO
Date of Service: 11-09-24 @ 13:16    Patient is a 79y old  Female who presents with a chief complaint of JIMENES (09 Nov 2024 07:01)      Any change in ROS: finally she has started feeling better:  slight improvement in her breathing since yesterday      MEDICATIONS  (STANDING):  albuterol/ipratropium for Nebulization 3 milliLiter(s) Nebulizer every 6 hours  apixaban 2.5 milliGRAM(s) Oral every 12 hours  atorvastatin 40 milliGRAM(s) Oral at bedtime  clopidogrel Tablet 75 milliGRAM(s) Oral daily  digoxin     Tablet 125 MICROGram(s) Oral daily  digoxin  Injectable 250 MICROGram(s) IV Push once  diltiazem Infusion 10 mG/Hr (10 mL/Hr) IV Continuous <Continuous>  donepezil 10 milliGRAM(s) Oral at bedtime  fluticasone propionate/ salmeterol 250-50 MICROgram(s) Diskus 1 Dose(s) Inhalation two times a day  montelukast 10 milliGRAM(s) Oral daily  sucralfate 1 Gram(s) Oral four times a day    MEDICATIONS  (PRN):  acetaminophen     Tablet .. 650 milliGRAM(s) Oral every 6 hours PRN Mild Pain (1 - 3)    Vital Signs Last 24 Hrs  T(C): 36.5 (09 Nov 2024 11:05), Max: 37.1 (09 Nov 2024 04:49)  T(F): 97.7 (09 Nov 2024 11:05), Max: 98.7 (09 Nov 2024 04:49)  HR: 95 (09 Nov 2024 11:05) (73 - 95)  BP: 121/65 (09 Nov 2024 11:05) (101/64 - 126/67)  BP(mean): --  RR: 18 (09 Nov 2024 11:05) (18 - 18)  SpO2: 96% (09 Nov 2024 11:05) (96% - 97%)    Parameters below as of 09 Nov 2024 11:05  Patient On (Oxygen Delivery Method): room air        I&O's Summary    08 Nov 2024 07:01  -  09 Nov 2024 07:00  --------------------------------------------------------  IN: 750 mL / OUT: 1350 mL / NET: -600 mL          Physical Exam:   GENERAL: NAD, well-groomed, well-developed  HEENT: ANTHONY/   Atraumatic, Normocephalic  ENMT: No tonsillar erythema, exudates, or enlargement; Moist mucous membranes, Good dentition, No lesions  NECK: Supple, No JVD, Normal thyroid  CHEST/LUNG: poor air entry bilaterally:   CVS: Regular rate and rhythm; No murmurs, rubs, or gallops  GI: : Soft, Nontender, Nondistended; Bowel sounds present  NERVOUS SYSTEM:  Alert & Oriented X3  EXTREMITIES: - edema  LYMPH: No lymphadenopathy noted  SKIN: No rashes or lesions  ENDOCRINOLOGY: No Thyromegaly  PSYCH: Appropriate    Labs:  25                            13.0   9.84  )-----------( 318      ( 06 Nov 2024 06:01 )             38.3     11-09    130[L]  |  98  |  20  ----------------------------<  166[H]  4.0   |  19[L]  |  0.96  11-08    129[L]  |  96  |  18  ----------------------------<  176[H]  4.0   |  18[L]  |  0.81  11-06    133[L]  |  99  |  19  ----------------------------<  101[H]  3.5   |  20[L]  |  0.94    Ca    9.6      09 Nov 2024 06:43  Ca    9.1      08 Nov 2024 06:27  Phos  2.7     11-09  Mg     2.1     11-09      CAPILLARY BLOOD GLUCOSE              Urinalysis Basic - ( 09 Nov 2024 06:43 )    Color: x / Appearance: x / SG: x / pH: x  Gluc: 166 mg/dL / Ketone: x  / Bili: x / Urobili: x   Blood: x / Protein: x / Nitrite: x   Leuk Esterase: x / RBC: x / WBC x   Sq Epi: x / Non Sq Epi: x / Bacteria: x            RECENT CULTURES:  11-04 @ 14:43 Clean Catch Clean Catch (Midstream)                >=3 organisms. Probable collection contamination.  rad< from: CT Chest No Cont (11.06.24 @ 13:48) >    ACC: [id number] EXAM:  CT CHEST   ORDERED BY: GERRY ROBLES     PROCEDURE DATE:  11/06/2024          INTERPRETATION:  INDICATION: Shortness of breath, dyspnea on exertion    TECHNIQUE: Helical acquisition images of the chest without intravenous   contrast. Maximum intensity projection images were generated.    COMPARISON: None.    FINDINGS:    LUNGS/AIRWAYS/PLEURA: Mild bronchiectasis in the right middle lobe.   Mucous impacted distal bronchi in the right upper and lower lobes.    Subpleural scarring at the apices and superior segment of the left lower   lobe.    LYMPH NODES/MEDIASTINUM: No lymphadenopathy.    HEART/VASCULATURE: Normal sized heart and aorta. No pericardial effusion.   Coronary artery calcifications. Calcified aorta.    UPPER ABDOMEN: Calcification in the spleen which may be from old   granulomatous disease.    BONES/SOFT TISSUES: Unremarkable.      IMPRESSION:    Mild bronchiectasis in the right middle lobe.    No pneumonia.    --- End of Report ---            MARGARITA ERYES M.D., ATTENDING RADIOLOGIST  This document has been electronically signed. Nov 6 2024  1:55PM    < end of copied text >  < from: Xray Chest 1 View- PORTABLE-Urgent (11.04.24 @ 15:06) >    ACC: [id number] EXAM:  XR CHEST PORTABLE URGENT 1V   ORDERED BY:  YAMILET GOODMAN     PROCEDURE DATE:  11/04/2024          INTERPRETATION:  EXAMINATION: XR CHEST URGENT    CLINICAL INDICATION: dyspnea on exertion    TECHNIQUE: Single frontal portable view of the chest from 11/4/2024 3:06   PM    COMPARISON: None.    FINDINGS:  The heart size is not accurately assessed on this projection.  The lungs are clear.  There is no pneumothorax or pleural effusion.    IMPRESSION:  Clear lungs.    --- Endof Report ---           ROBLES ONEILL MD; Resident Radiologist  This document has been electronically signed.  LAUREN SHANNON MD; Attending Radiologist  This document has been electronically signed. Nov 4 2024  3:13PM    < end of copied text >          RESPIRATORY CULTURES:          Studies  Chest X-RAY  CT SCAN Chest   Venous Dopplers: LE:   CT Abdomen  Others              
Date of Service: 11-12-24 @ 15:38    Patient is a 79y old  Female who presents with a chief complaint of JIMENES (12 Nov 2024 11:06)      Any change in ROS: seems very good:   mild sob on exertion:  but overall better       MEDICATIONS  (STANDING):  apixaban 2.5 milliGRAM(s) Oral every 12 hours  atorvastatin 40 milliGRAM(s) Oral at bedtime  Breztri inhaler (Budesonide, Glycopyrrolate, and Formoterol 1 Puff(s) 1 Puff(s) Inhalation two times a day  clopidogrel Tablet 75 milliGRAM(s) Oral daily  digoxin     Tablet 125 MICROGram(s) Oral daily  diltiazem    Tablet 60 milliGRAM(s) Oral four times a day  donepezil 10 milliGRAM(s) Oral at bedtime  montelukast 10 milliGRAM(s) Oral daily  predniSONE   Tablet 40 milliGRAM(s) Oral daily  roflumilast 250 MICROGram(s) Oral daily  sucralfate 1 Gram(s) Oral four times a day    MEDICATIONS  (PRN):  acetaminophen     Tablet .. 650 milliGRAM(s) Oral every 6 hours PRN Mild Pain (1 - 3)  albuterol/ipratropium for Nebulization 3 milliLiter(s) Nebulizer every 6 hours PRN Shortness of Breath and/or Wheezing    Vital Signs Last 24 Hrs  T(C): 36.7 (12 Nov 2024 11:11), Max: 36.8 (11 Nov 2024 17:06)  T(F): 98.1 (12 Nov 2024 11:11), Max: 98.2 (11 Nov 2024 17:06)  HR: 83 (12 Nov 2024 11:11) (81 - 98)  BP: 138/75 (12 Nov 2024 11:11) (127/62 - 150/82)  BP(mean): --  RR: 18 (12 Nov 2024 11:11) (16 - 18)  SpO2: 96% (12 Nov 2024 11:11) (96% - 98%)    Parameters below as of 12 Nov 2024 11:11  Patient On (Oxygen Delivery Method): room air        I&O's Summary    11 Nov 2024 07:01  -  12 Nov 2024 07:00  --------------------------------------------------------  IN: 760 mL / OUT: 2200 mL / NET: -1440 mL    12 Nov 2024 07:01  -  12 Nov 2024 15:38  --------------------------------------------------------  IN: 480 mL / OUT: 1200 mL / NET: -720 mL          Physical Exam:   GENERAL: NAD, well-groomed, well-developed  HEENT: ANTHONY/   Atraumatic, Normocephalic  ENMT: No tonsillar erythema, exudates, or enlargement; Moist mucous membranes, Good dentition, No lesions  NECK: Supple, No JVD, Normal thyroid  CHEST/LUNG: poor air entry    CVS: Regular rate and rhythm; No murmurs, rubs, or gallops  GI: : Soft, Nontender, Nondistended; Bowel sounds present  NERVOUS SYSTEM:  Alert & Oriented X3  EXTREMITIES:  2+ Peripheral Pulses, No clubbing, cyanosis, or edema  LYMPH: No lymphadenopathy noted  SKIN: No rashes or lesions  ENDOCRINOLOGY: No Thyromegaly  PSYCH: Appropriate    Labs:                              11.8   12.33 )-----------( 278      ( 11 Nov 2024 06:44 )             35.3                         11.7   15.72 )-----------( 276      ( 10 Nov 2024 06:34 )             33.5     11-12    130[L]  |  96  |  22  ----------------------------<  111[H]  3.5   |  23  |  0.78  11-11    129[L]  |  97  |  27[H]  ----------------------------<  145[H]  4.1   |  19[L]  |  0.80  11-10    129[L]  |  98  |  23  ----------------------------<  156[H]  3.9   |  19[L]  |  0.88  11-09    130[L]  |  98  |  20  ----------------------------<  166[H]  4.0   |  19[L]  |  0.96    Ca    9.0      12 Nov 2024 06:22  Ca    8.6      11 Nov 2024 06:44      CAPILLARY BLOOD GLUCOSE              Urinalysis Basic - ( 12 Nov 2024 06:22 )    Color: x / Appearance: x / SG: x / pH: x  Gluc: 111 mg/dL / Ketone: x  / Bili: x / Urobili: x   Blood: x / Protein: x / Nitrite: x   Leuk Esterase: x / RBC: x / WBC x   Sq Epi: x / Non Sq Epi: x / Bacteria: x            RECENT CULTURES:        RESPIRATORY CULTURES:    rad< from: CT Chest No Cont (11.06.24 @ 13:48) >  Mucous impacted distal bronchi in the right upper and lower lobes.    Subpleural scarring at the apices and superior segment of the left lower   lobe.    LYMPH NODES/MEDIASTINUM: No lymphadenopathy.    HEART/VASCULATURE: Normal sized heart and aorta. No pericardial effusion.   Coronary artery calcifications. Calcified aorta.    UPPER ABDOMEN: Calcification in the spleen which may be from old   granulomatous disease.    BONES/SOFT TISSUES: Unremarkable.      IMPRESSION:    Mild bronchiectasis in the right middle lobe.    No pneumonia.    --- End of Report ---            MARGARITA REYES M.D., ATTENDING RADIOLOGIST  This document has been electronically signed. Nov 6 2024  1:55PM    < end of copied text >        Studies  Chest X-RAY  CT SCAN Chest   Venous Dopplers: LE:   CT Abdomen  Others              
Patient is a 79y old  Female who presents with a chief complaint of JIMENES (08 Nov 2024 08:36)    Date of servie : 11-08-24 @ 14:03  INTERVAL HPI/OVERNIGHT EVENTS:  T(C): 36.7 (11-08-24 @ 11:15), Max: 36.7 (11-08-24 @ 04:40)  HR: 64 (11-08-24 @ 11:15) (64 - 122)  BP: 109/65 (11-08-24 @ 11:15) (104/63 - 127/67)  RR: 18 (11-08-24 @ 11:15) (18 - 18)  SpO2: 100% (11-08-24 @ 11:15) (94% - 100%)  Wt(kg): --  I&O's Summary    07 Nov 2024 07:01  -  08 Nov 2024 07:00  --------------------------------------------------------  IN: 700 mL / OUT: 1150 mL / NET: -450 mL    08 Nov 2024 07:01  -  08 Nov 2024 14:03  --------------------------------------------------------  IN: 750 mL / OUT: 200 mL / NET: 550 mL        LABS:    11-08    129[L]  |  96  |  18  ----------------------------<  176[H]  4.0   |  18[L]  |  0.81    Ca    9.1      08 Nov 2024 06:27        Urinalysis Basic - ( 08 Nov 2024 06:27 )    Color: x / Appearance: x / SG: x / pH: x  Gluc: 176 mg/dL / Ketone: x  / Bili: x / Urobili: x   Blood: x / Protein: x / Nitrite: x   Leuk Esterase: x / RBC: x / WBC x   Sq Epi: x / Non Sq Epi: x / Bacteria: x      CAPILLARY BLOOD GLUCOSE            Urinalysis Basic - ( 08 Nov 2024 06:27 )    Color: x / Appearance: x / SG: x / pH: x  Gluc: 176 mg/dL / Ketone: x  / Bili: x / Urobili: x   Blood: x / Protein: x / Nitrite: x   Leuk Esterase: x / RBC: x / WBC x   Sq Epi: x / Non Sq Epi: x / Bacteria: x        MEDICATIONS  (STANDING):  albuterol/ipratropium for Nebulization 3 milliLiter(s) Nebulizer every 6 hours  apixaban 2.5 milliGRAM(s) Oral every 12 hours  atorvastatin 40 milliGRAM(s) Oral at bedtime  clopidogrel Tablet 75 milliGRAM(s) Oral daily  digoxin  Injectable 250 MICROGram(s) IV Push once  diltiazem Infusion 10 mG/Hr (10 mL/Hr) IV Continuous <Continuous>  donepezil 10 milliGRAM(s) Oral at bedtime  fluticasone propionate/ salmeterol 250-50 MICROgram(s) Diskus 1 Dose(s) Inhalation two times a day  methylPREDNISolone sodium succinate Injectable 40 milliGRAM(s) IV Push every 8 hours  montelukast 10 milliGRAM(s) Oral daily  sucralfate 1 Gram(s) Oral four times a day    MEDICATIONS  (PRN):  acetaminophen     Tablet .. 650 milliGRAM(s) Oral every 6 hours PRN Mild Pain (1 - 3)          PHYSICAL EXAM:  GENERAL: NAD, well-groomed, well-developed  HEAD:  Atraumatic, Normocephalic  CHEST/LUNG: Clear to percussion bilaterally; No rales, rhonchi, wheezing, or rubs  HEART: Regular rate and rhythm; No murmurs, rubs, or gallops  ABDOMEN: Soft, Nontender, Nondistended; Bowel sounds present  EXTREMITIES:  2+ Peripheral Pulses, No clubbing, cyanosis, or edema  LYMPH: No lymphadenopathy noted  SKIN: No rashes or lesions    Care Discussed with Consultants/Other Providers [ ] YES  [ ] NO
Patient is a 79y old  Female who presents with a chief complaint of Other form of dyspnea     (06 Nov 2024 16:26)    Date of servie : 11-06-24 @ 16:54  INTERVAL HPI/OVERNIGHT EVENTS:  T(C): 36.7 (11-06-24 @ 11:31), Max: 37.1 (11-05-24 @ 20:00)  HR: 103 (11-06-24 @ 15:20) (72 - 126)  BP: 131/69 (11-06-24 @ 15:20) (110/59 - 146/56)  RR: 18 (11-06-24 @ 11:31) (18 - 18)  SpO2: 96% (11-06-24 @ 11:31) (95% - 97%)  Wt(kg): --  I&O's Summary    05 Nov 2024 07:01  -  06 Nov 2024 07:00  --------------------------------------------------------  IN: 700 mL / OUT: 1720 mL / NET: -1020 mL    06 Nov 2024 07:01  -  06 Nov 2024 16:54  --------------------------------------------------------  IN: 480 mL / OUT: 300 mL / NET: 180 mL        LABS:                        13.0   9.84  )-----------( 318      ( 06 Nov 2024 06:01 )             38.3     11-06    133[L]  |  99  |  19  ----------------------------<  101[H]  3.5   |  20[L]  |  0.94    Ca    9.2      06 Nov 2024 06:02        Urinalysis Basic - ( 06 Nov 2024 06:02 )    Color: x / Appearance: x / SG: x / pH: x  Gluc: 101 mg/dL / Ketone: x  / Bili: x / Urobili: x   Blood: x / Protein: x / Nitrite: x   Leuk Esterase: x / RBC: x / WBC x   Sq Epi: x / Non Sq Epi: x / Bacteria: x      CAPILLARY BLOOD GLUCOSE            Urinalysis Basic - ( 06 Nov 2024 06:02 )    Color: x / Appearance: x / SG: x / pH: x  Gluc: 101 mg/dL / Ketone: x  / Bili: x / Urobili: x   Blood: x / Protein: x / Nitrite: x   Leuk Esterase: x / RBC: x / WBC x   Sq Epi: x / Non Sq Epi: x / Bacteria: x        MEDICATIONS  (STANDING):  albuterol/ipratropium for Nebulization 3 milliLiter(s) Nebulizer every 6 hours  apixaban 2.5 milliGRAM(s) Oral every 12 hours  atorvastatin 40 milliGRAM(s) Oral at bedtime  clopidogrel Tablet 75 milliGRAM(s) Oral daily  diltiazem    Tablet 60 milliGRAM(s) Oral four times a day  donepezil 10 milliGRAM(s) Oral at bedtime  fluticasone propionate/ salmeterol 250-50 MICROgram(s) Diskus 1 Dose(s) Inhalation two times a day  montelukast 10 milliGRAM(s) Oral daily  sucralfate 1 Gram(s) Oral four times a day    MEDICATIONS  (PRN):  acetaminophen     Tablet .. 650 milliGRAM(s) Oral every 6 hours PRN Mild Pain (1 - 3)          PHYSICAL EXAM:  GENERAL: NAD, well-groomed, well-developed  HEAD:  Atraumatic, Normocephalic  CHEST/LUNG: Clear to percussion bilaterally; No rales, rhonchi, wheezing, or rubs  HEART: Regular rate and rhythm; No murmurs, rubs, or gallops  ABDOMEN: Soft, Nontender, Nondistended; Bowel sounds present  EXTREMITIES:  2+ Peripheral Pulses, No clubbing, cyanosis, or edema  LYMPH: No lymphadenopathy noted  SKIN: No rashes or lesions    Care Discussed with Consultants/Other Providers [ ] YES  [ ] NO
Podiatry pager #: [phone]/ [phone]    Patient is a 79y old  Female who presents with a chief complaint of JIMENES (08 Nov 2024 16:17)Podiatry consultation for evaluation of the toenails of both big toes.      HPI:  79-year-old female with past medical history of COPD, CAD status post 3 stents on aspirin, atrial fibrillation on Eliquis, GERD, presenting to the ED for evaluation of worsening dyspnea on exertion over the past several months.  Patient symptoms began 2 years ago and have progressively been worsening over the past 7 months.  Patient has seen pulmonologist as well as cardiologist with no significant improvement in her symptoms.  Patient is currently finishing a course of steroids with no improvement.  Patient reports her dyspnea on exertion exacerbates when talking, walking, eating, and is alleviated by rest.  They report the patient also has a pulse oximetry at home that is 99 to 100% at rest and drops to 86% when she is walking.  Patient is not on any home oxygen.  She denies any chest pain, cough, fever, chills, nausea, vomiting, diarrhea, abdominal pain, dysuria, hematuria, flank pain, headache, and any additional complaints at this time.  No recent travel or sick contacts.   (04 Nov 2024 16:36)      PAST MEDICAL & SURGICAL HISTORY:  COPD, mild      Hypertension      Acid reflux      History of IBS      Premature ventricular beat      Redundant colon      H/O: hysterectomy      History of lumbar surgery      S/P LASIK surgery of both eyes      Brain aneurysm  embolization      H/O coronary angiogram  2 stents          MEDICATIONS  (STANDING):  albuterol/ipratropium for Nebulization 3 milliLiter(s) Nebulizer every 6 hours  apixaban 2.5 milliGRAM(s) Oral every 12 hours  atorvastatin 40 milliGRAM(s) Oral at bedtime  clopidogrel Tablet 75 milliGRAM(s) Oral daily  digoxin  Injectable 250 MICROGram(s) IV Push once  diltiazem Infusion 10 mG/Hr (10 mL/Hr) IV Continuous <Continuous>  donepezil 10 milliGRAM(s) Oral at bedtime  fluticasone propionate/ salmeterol 250-50 MICROgram(s) Diskus 1 Dose(s) Inhalation two times a day  methylPREDNISolone sodium succinate Injectable 40 milliGRAM(s) IV Push every 8 hours  montelukast 10 milliGRAM(s) Oral daily  sucralfate 1 Gram(s) Oral four times a day    MEDICATIONS  (PRN):  acetaminophen     Tablet .. 650 milliGRAM(s) Oral every 6 hours PRN Mild Pain (1 - 3)      Allergies    codeine (Rash; Urticaria; Hives)  sulfa drugs (Vomiting; Nausea)  atenolol (Hives)  omeprazole (Short breath; Urticaria)    Intolerances        VITALS:    Vital Signs Last 24 Hrs  T(C): 36.7 (08 Nov 2024 11:15), Max: 36.7 (08 Nov 2024 04:40)  T(F): 98 (08 Nov 2024 11:15), Max: 98.1 (08 Nov 2024 04:40)  HR: 64 (08 Nov 2024 11:15) (64 - 122)  BP: 109/65 (08 Nov 2024 11:15) (109/65 - 127/62)  BP(mean): --  RR: 18 (08 Nov 2024 11:15) (18 - 18)  SpO2: 100% (08 Nov 2024 11:15) (94% - 100%)    Parameters below as of 08 Nov 2024 11:15  Patient On (Oxygen Delivery Method): room air        LABS:        11-08    129[L]  |  96  |  18  ----------------------------<  176[H]  4.0   |  18[L]  |  0.81    Ca    9.1      08 Nov 2024 06:27        CAPILLARY BLOOD GLUCOSE              LOWER EXTREMITY PHYSICAL EXAM:    Vasular: DP/PT 1_/4, B/L, CFT <_2 seconds B/L, Temperature gradient _wnl, B/L.   Neuro: Epicritic sensation intact_ to the level of _toes, B/L.  Skin: Positive dystrophic ingrown brittle bilateral hallux nail plates with subungual debris.  No clinical signs of infection well trimmed no paronychia noted bilateral.      RADIOLOGY & ADDITIONAL STUDIES:    
    Subjective: Patient seen and examined. No new events except as noted.     SUBJECTIVE/ROS:  No chest pain, dyspnea, palpitation, or dizziness.       MEDICATIONS:  MEDICATIONS  (STANDING):  albuterol/ipratropium for Nebulization 3 milliLiter(s) Nebulizer every 6 hours  apixaban 2.5 milliGRAM(s) Oral every 12 hours  atorvastatin 40 milliGRAM(s) Oral at bedtime  clopidogrel Tablet 75 milliGRAM(s) Oral daily  digoxin     Tablet 125 MICROGram(s) Oral daily  digoxin  Injectable 250 MICROGram(s) IV Push once  diltiazem Infusion 10 mG/Hr (10 mL/Hr) IV Continuous <Continuous>  donepezil 10 milliGRAM(s) Oral at bedtime  fluticasone propionate/ salmeterol 250-50 MICROgram(s) Diskus 1 Dose(s) Inhalation two times a day  montelukast 10 milliGRAM(s) Oral daily  sucralfate 1 Gram(s) Oral four times a day      PHYSICAL EXAM:  T(C): 37.1 (11-09-24 @ 04:49), Max: 37.1 (11-09-24 @ 04:49)  HR: 73 (11-09-24 @ 04:49) (64 - 98)  BP: 109/61 (11-09-24 @ 04:49) (101/64 - 126/67)  RR: 18 (11-09-24 @ 04:49) (18 - 18)  SpO2: 97% (11-09-24 @ 04:49) (95% - 100%)  Wt(kg): --  I&O's Summary    08 Nov 2024 07:01  -  09 Nov 2024 07:00  --------------------------------------------------------  IN: 750 mL / OUT: 200 mL / NET: 550 mL            JVP: Normal  Neck: supple  Lung: clear   CV: S1 S2 , Murmur:  Abd: soft  Ext: No edema  neuro: Awake / alert  Psych: flat affect  Skin: normal``    LABS/DATA:    CARDIAC MARKERS:            11-08    129[L]  |  96  |  18  ----------------------------<  176[H]  4.0   |  18[L]  |  0.81    Ca    9.1      08 Nov 2024 06:27      proBNP:   Lipid Profile:   HgA1c:   TSH:     TELE:  EKG:        
    Subjective: Patient seen and examined. No new events except as noted.     SUBJECTIVE/ROS:  feels ok       MEDICATIONS:  MEDICATIONS  (STANDING):  albuterol/ipratropium for Nebulization 3 milliLiter(s) Nebulizer every 6 hours  apixaban 2.5 milliGRAM(s) Oral every 12 hours  atorvastatin 40 milliGRAM(s) Oral at bedtime  clopidogrel Tablet 75 milliGRAM(s) Oral daily  diltiazem    Tablet 60 milliGRAM(s) Oral four times a day  donepezil 10 milliGRAM(s) Oral at bedtime  fluticasone propionate/ salmeterol 250-50 MICROgram(s) Diskus 1 Dose(s) Inhalation two times a day  montelukast 10 milliGRAM(s) Oral daily  ondansetron    Tablet 4 milliGRAM(s) Oral once  sucralfate 1 Gram(s) Oral four times a day      PHYSICAL EXAM:  T(C): 36.5 (11-06-24 @ 04:16), Max: 37.1 (11-05-24 @ 20:00)  HR: 77 (11-06-24 @ 04:16) (77 - 126)  BP: 117/65 (11-06-24 @ 04:16) (110/73 - 146/56)  RR: 18 (11-06-24 @ 04:16) (18 - 18)  SpO2: 95% (11-06-24 @ 04:16) (95% - 98%)  Wt(kg): --  I&O's Summary    05 Nov 2024 07:01  -  06 Nov 2024 07:00  --------------------------------------------------------  IN: 700 mL / OUT: 1720 mL / NET: -1020 mL            JVP: Normal  Neck: supple  Lung: clear   CV: S1 S2 , Murmur:  Abd: soft  Ext: No edema  neuro: Awake / alert  Psych: flat affect  Skin: normal``    LABS/DATA:    CARDIAC MARKERS:                                13.0   9.84  )-----------( 318      ( 06 Nov 2024 06:01 )             38.3     11-06    133[L]  |  99  |  19  ----------------------------<  101[H]  3.5   |  20[L]  |  0.94    Ca    9.2      06 Nov 2024 06:02  Mg     2.2     11-04    TPro  6.6  /  Alb  4.1  /  TBili  0.3  /  DBili  x   /  AST  17  /  ALT  19  /  AlkPhos  50  11-04    proBNP:   Lipid Profile:   HgA1c:   TSH:     TELE:  EKG:        
    Subjective: Patient seen and examined. No new events except as noted.     SUBJECTIVE/ROS:  na      MEDICATIONS:  MEDICATIONS  (STANDING):  albuterol/ipratropium for Nebulization 3 milliLiter(s) Nebulizer every 6 hours  apixaban 2.5 milliGRAM(s) Oral every 12 hours  atorvastatin 40 milliGRAM(s) Oral at bedtime  clopidogrel Tablet 75 milliGRAM(s) Oral daily  digoxin     Tablet 125 MICROGram(s) Oral daily  diltiazem    Tablet 60 milliGRAM(s) Oral four times a day  donepezil 10 milliGRAM(s) Oral at bedtime  fluticasone propionate/ salmeterol 250-50 MICROgram(s) Diskus 1 Dose(s) Inhalation two times a day  methylPREDNISolone sodium succinate Injectable 20 milliGRAM(s) IV Push every 12 hours  montelukast 10 milliGRAM(s) Oral daily  sucralfate 1 Gram(s) Oral four times a day      PHYSICAL EXAM:  T(C): 36.6 (11-11-24 @ 04:45), Max: 36.6 (11-11-24 @ 04:45)  HR: 74 (11-11-24 @ 04:45) (74 - 96)  BP: 127/70 (11-11-24 @ 04:45) (120/68 - 133/66)  RR: 18 (11-11-24 @ 04:45) (18 - 18)  SpO2: 97% (11-11-24 @ 04:45) (96% - 97%)  Wt(kg): --  I&O's Summary    10 Nov 2024 07:01  -  11 Nov 2024 07:00  --------------------------------------------------------  IN: 480 mL / OUT: 1200 mL / NET: -720 mL            JVP: Normal  Neck: supple  Lung: clear   CV: S1 S2 , Murmur:  Abd: soft  Ext: No edema  neuro: Awake / alert  Psych: flat affect  Skin: normal``    LABS/DATA:    CARDIAC MARKERS:                                11.8   12.33 )-----------( 278      ( 11 Nov 2024 06:44 )             35.3     11-11    129[L]  |  97  |  27[H]  ----------------------------<  145[H]  4.1   |  19[L]  |  0.80    Ca    8.6      11 Nov 2024 06:44  Phos  2.5     11-10  Mg     2.0     11-10      proBNP:   Lipid Profile:   HgA1c:   TSH:     TELE:  EKG:        
Date of Service: 11-06-24 @ 15:32    Patient is a 79y old  Female who presents with a chief complaint of JIMENES (06 Nov 2024 09:46)      Any change in ROS: she seems OK:  no sob:  n o c ugh : + phlegm : no overnight events:       MEDICATIONS  (STANDING):  albuterol/ipratropium for Nebulization 3 milliLiter(s) Nebulizer every 6 hours  apixaban 2.5 milliGRAM(s) Oral every 12 hours  atorvastatin 40 milliGRAM(s) Oral at bedtime  clopidogrel Tablet 75 milliGRAM(s) Oral daily  diltiazem    Tablet 60 milliGRAM(s) Oral four times a day  donepezil 10 milliGRAM(s) Oral at bedtime  fluticasone propionate/ salmeterol 250-50 MICROgram(s) Diskus 1 Dose(s) Inhalation two times a day  montelukast 10 milliGRAM(s) Oral daily  sucralfate 1 Gram(s) Oral four times a day    MEDICATIONS  (PRN):  acetaminophen     Tablet .. 650 milliGRAM(s) Oral every 6 hours PRN Mild Pain (1 - 3)    Vital Signs Last 24 Hrs  T(C): 36.7 (06 Nov 2024 11:31), Max: 37.1 (05 Nov 2024 20:00)  T(F): 98.1 (06 Nov 2024 11:31), Max: 98.8 (05 Nov 2024 20:00)  HR: 72 (06 Nov 2024 11:31) (72 - 126)  BP: 110/59 (06 Nov 2024 11:31) (110/59 - 146/56)  BP(mean): --  RR: 18 (06 Nov 2024 11:31) (18 - 18)  SpO2: 96% (06 Nov 2024 11:31) (95% - 98%)    Parameters below as of 06 Nov 2024 11:31  Patient On (Oxygen Delivery Method): room air        I&O's Summary    05 Nov 2024 07:01  -  06 Nov 2024 07:00  --------------------------------------------------------  IN: 700 mL / OUT: 1720 mL / NET: -1020 mL    06 Nov 2024 07:01  -  06 Nov 2024 15:32  --------------------------------------------------------  IN: 480 mL / OUT: 300 mL / NET: 180 mL          Physical Exam:   GENERAL: NAD, well-groomed, well-developed  HEENT: ANTHONY/   Atraumatic, Normocephalic  ENMT: No tonsillar erythema, exudates, or enlargement; Moist mucous membranes, Good dentition, No lesions  NECK: Supple, No JVD, Normal thyroid  CHEST/LUNG: milc coarse crackles  CVS: Regular rate and rhythm; No murmurs, rubs, or gallops  GI: : Soft, Nontender, Nondistended; Bowel sounds present  NERVOUS SYSTEM:  Alert & Oriented X3  EXTREMITIES: - edema  LYMPH: No lymphadenopathy noted  SKIN: No rashes or lesions  ENDOCRINOLOGY: No Thyromegaly  PSYCH: Appropriate    Labs:  25                            13.0   9.84  )-----------( 318      ( 06 Nov 2024 06:01 )             38.3                         13.4   12.23 )-----------( 336      ( 05 Nov 2024 06:28 )             40.7                         11.8   12.02 )-----------( 328      ( 04 Nov 2024 13:23 )             34.7     11-06    133[L]  |  99  |  19  ----------------------------<  101[H]  3.5   |  20[L]  |  0.94  11-05    131[L]  |  96  |  23  ----------------------------<  123[H]  3.9   |  19[L]  |  1.02  11-04    132[L]  |  96  |  33[H]  ----------------------------<  97  3.4[L]   |  23  |  1.09    Ca    9.2      06 Nov 2024 06:02  Ca    9.0      05 Nov 2024 06:28    TPro  6.6  /  Alb  4.1  /  TBili  0.3  /  DBili  x   /  AST  17  /  ALT  19  /  AlkPhos  50  11-04    CAPILLARY BLOOD GLUCOSE              Urinalysis Basic - ( 06 Nov 2024 06:02 )    Color: x / Appearance: x / SG: x / pH: x  Gluc: 101 mg/dL / Ketone: x  / Bili: x / Urobili: x   Blood: x / Protein: x / Nitrite: x   Leuk Esterase: x / RBC: x / WBC x   Sq Epi: x / Non Sq Epi: x / Bacteria: x      Lactate, Blood: 2.0 mmol/L (11-04 @ 16:30)        RECENT CULTURES:  11-04 @ 14:43 Clean Catch Clean Catch (Midstream)       rad< from: CT Chest No Cont (11.06.24 @ 13:48) >  lobe.    LYMPH NODES/MEDIASTINUM: No lymphadenopathy.    HEART/VASCULATURE: Normal sized heart and aorta. No pericardial effusion.   Coronary artery calcifications. Calcified aorta.    UPPER ABDOMEN: Calcification in the spleen which may be from old   granulomatous disease.    BONES/SOFT TISSUES: Unremarkable.      IMPRESSION:    Mild bronchiectasis in the right middle lobe.    No pneumonia.    --- End of Report ---            MARGARITA REYES M.D., ATTENDING RADIOLOGIST  This document has been electronically signed. Nov 6 2024  1:55PM    < end of copied text >           >=3 organisms. Probable collection contamination.          RESPIRATORY CULTURES:          Studies  Chest X-RAY  CT SCAN Chest   Venous Dopplers: LE:   CT Abdomen  Others              
Date of Service: 11-07-24 @ 15:51    Patient is a 79y old  Female who presents with a chief complaint of JIMENES (07 Nov 2024 09:58)      Any change in ROS: Son at bedside:  seems OK:  + sob:  no cough :  no phlegm     MEDICATIONS  (STANDING):  albuterol/ipratropium for Nebulization 3 milliLiter(s) Nebulizer every 6 hours  apixaban 2.5 milliGRAM(s) Oral every 12 hours  atorvastatin 40 milliGRAM(s) Oral at bedtime  clopidogrel Tablet 75 milliGRAM(s) Oral daily  diltiazem    Tablet 60 milliGRAM(s) Oral four times a day  donepezil 10 milliGRAM(s) Oral at bedtime  fluticasone propionate/ salmeterol 250-50 MICROgram(s) Diskus 1 Dose(s) Inhalation two times a day  methylPREDNISolone sodium succinate Injectable 40 milliGRAM(s) IV Push every 8 hours  montelukast 10 milliGRAM(s) Oral daily  sucralfate 1 Gram(s) Oral four times a day    MEDICATIONS  (PRN):  acetaminophen     Tablet .. 650 milliGRAM(s) Oral every 6 hours PRN Mild Pain (1 - 3)    Vital Signs Last 24 Hrs  T(C): 36.4 (07 Nov 2024 11:43), Max: 37.1 (07 Nov 2024 04:54)  T(F): 97.5 (07 Nov 2024 11:43), Max: 98.7 (07 Nov 2024 04:54)  HR: 79 (07 Nov 2024 14:50) (77 - 96)  BP: 104/63 (07 Nov 2024 14:50) (104/63 - 121/62)  BP(mean): --  RR: 18 (07 Nov 2024 11:43) (18 - 18)  SpO2: 98% (07 Nov 2024 14:50) (97% - 99%)    Parameters below as of 07 Nov 2024 14:50  Patient On (Oxygen Delivery Method): room air        I&O's Summary    06 Nov 2024 07:01  -  07 Nov 2024 07:00  --------------------------------------------------------  IN: 480 mL / OUT: 650 mL / NET: -170 mL    07 Nov 2024 07:01  -  07 Nov 2024 15:51  --------------------------------------------------------  IN: 480 mL / OUT: 200 mL / NET: 280 mL          Physical Exam:   GENERAL: NAD, well-groomed, well-developed  HEENT: ANTHONY/   Atraumatic, Normocephalic  ENMT: No tonsillar erythema, exudates, or enlargement; Moist mucous membranes, Good dentition, No lesions  NECK: Supple, No JVD, Normal thyroid  CHEST/LUNG: poor air entry bilaterally:   CVS: Regular rate and rhythm; No murmurs, rubs, or gallops  GI: : Soft, Nontender, Nondistended; Bowel sounds present  NERVOUS SYSTEM:  Alert & Oriented X3  EXTREMITIES:  - edema  LYMPH: No lymphadenopathy noted  SKIN: No rashes or lesions  ENDOCRINOLOGY: No Thyromegaly  PSYCH: Appropriate    Labs:  25                            13.0   9.84  )-----------( 318      ( 06 Nov 2024 06:01 )             38.3                         13.4   12.23 )-----------( 336      ( 05 Nov 2024 06:28 )             40.7                         11.8   12.02 )-----------( 328      ( 04 Nov 2024 13:23 )             34.7     11-06    133[L]  |  99  |  19  ----------------------------<  101[H]  3.5   |  20[L]  |  0.94  11-05    131[L]  |  96  |  23  ----------------------------<  123[H]  3.9   |  19[L]  |  1.02  11-04    132[L]  |  96  |  33[H]  ----------------------------<  97  3.4[L]   |  23  |  1.09    Ca    9.2      06 Nov 2024 06:02    TPro  6.6  /  Alb  4.1  /  TBili  0.3  /  DBili  x   /  AST  17  /  ALT  19  /  AlkPhos  50  11-04    CAPILLARY BLOOD GLUCOSE              Urinalysis Basic - ( 06 Nov 2024 06:02 )    Color: x / Appearance: x / SG: x / pH: x  Gluc: 101 mg/dL / Ketone: x  / Bili: x / Urobili: x   Blood: x / Protein: x / Nitrite: x   Leuk Esterase: x / RBC: x / WBC x   Sq Epi: x / Non Sq Epi: x / Bacteria: x      Lactate, Blood: 2.0 mmol/L (11-04 @ 16:30)        RECENT CULTURES:  11-04 @ 14:43 Clean Catch Clean Catch (Midstream)       rad< from: CT Chest No Cont (11.06.24 @ 13:48) >  Coronary artery calcifications. Calcified aorta.    UPPER ABDOMEN: Calcification in the spleen which may be from old   granulomatous disease.    BONES/SOFT TISSUES: Unremarkable.      IMPRESSION:    Mild bronchiectasis in the right middle lobe.    No pneumonia.    --- End of Report ---            MARGARITA REYES M.D., ATTENDING RADIOLOGIST  This document has been electronically signed. Nov 6 2024  1:55PM    < end of copied text >  < from: Xray Chest 1 View- PORTABLE-Urgent (11.04.24 @ 15:06) >    CLINICAL INDICATION: dyspnea on exertion    TECHNIQUE: Single frontal portable view of the chest from 11/4/2024 3:06   PM    COMPARISON: None.    FINDINGS:  The heart size is not accurately assessed on this projection.  The lungs are clear.  There is no pneumothorax or pleural effusion.    IMPRESSION:  Clear lungs.    --- Endof Report ---           ROBLES ONEILL MD; Resident Radiologist  This document has been electronically signed.  LAUREN SHANNON MD; Attending Radiologist  This document has been electronically signed. Nov 4 2024  3:13PM    < end of copied text >           >=3 organisms. Probable collection contamination.          RESPIRATORY CULTURES:          Studies  Chest X-RAY  CT SCAN Chest   Venous Dopplers: LE:   CT Abdomen  Others    < from: Nuclear Stress Test-Pharmacologic.. (11.06.24 @ 10:45) >  --------------------------------------------------------------------------------------------------------------------------------------------------------Conclusions:   1. Myocardial Perfusion: Mildly Abnormal.   2. Perfusion Findings: There is a medium sized, moderate defect in the mid to distal inferoseptum and apicoseptum that is fixed consistent with scar versus attenuation artifact (notably, patient unable to undergo prone imaging). No evidence of ischemia.   3. Hypokinesis of the distal septum and apical septum.   4. The post stress left ventricular EF is 64 %. The stress end diastolic volume is 43 ml.   5. Stress electrocardiogram: No significant ischemic ST segment changes.Stress electrocardiogram: No significant ischemic ST segment changes beyond baseline abnormalities.    < end of copied text >            
Date of Service: 11-08-24 @ 16:17    Patient is a 79y old  Female who presents with a chief complaint of JIMENES (08 Nov 2024 14:03)      Any change in ROS: She says she has no improvement in her resp satatus:  still dyspneic : though on room air        MEDICATIONS  (STANDING):  albuterol/ipratropium for Nebulization 3 milliLiter(s) Nebulizer every 6 hours  apixaban 2.5 milliGRAM(s) Oral every 12 hours  atorvastatin 40 milliGRAM(s) Oral at bedtime  clopidogrel Tablet 75 milliGRAM(s) Oral daily  digoxin  Injectable 250 MICROGram(s) IV Push once  diltiazem Infusion 10 mG/Hr (10 mL/Hr) IV Continuous <Continuous>  donepezil 10 milliGRAM(s) Oral at bedtime  fluticasone propionate/ salmeterol 250-50 MICROgram(s) Diskus 1 Dose(s) Inhalation two times a day  methylPREDNISolone sodium succinate Injectable 40 milliGRAM(s) IV Push every 8 hours  montelukast 10 milliGRAM(s) Oral daily  sucralfate 1 Gram(s) Oral four times a day    MEDICATIONS  (PRN):  acetaminophen     Tablet .. 650 milliGRAM(s) Oral every 6 hours PRN Mild Pain (1 - 3)    Vital Signs Last 24 Hrs  T(C): 36.7 (08 Nov 2024 11:15), Max: 36.7 (08 Nov 2024 04:40)  T(F): 98 (08 Nov 2024 11:15), Max: 98.1 (08 Nov 2024 04:40)  HR: 64 (08 Nov 2024 11:15) (64 - 122)  BP: 109/65 (08 Nov 2024 11:15) (109/65 - 127/62)  BP(mean): --  RR: 18 (08 Nov 2024 11:15) (18 - 18)  SpO2: 100% (08 Nov 2024 11:15) (94% - 100%)    Parameters below as of 08 Nov 2024 11:15  Patient On (Oxygen Delivery Method): room air        I&O's Summary    07 Nov 2024 07:01  -  08 Nov 2024 07:00  --------------------------------------------------------  IN: 700 mL / OUT: 1150 mL / NET: -450 mL    08 Nov 2024 07:01  -  08 Nov 2024 16:17  --------------------------------------------------------  IN: 750 mL / OUT: 200 mL / NET: 550 mL          Physical Exam:   GENERAL: NAD, well-groomed, well-developed  HEENT: ANTHONY/   Atraumatic, Normocephalic  ENMT: No tonsillar erythema, exudates, or enlargement; Moist mucous membranes, Good dentition, No lesions  NECK: Supple, No JVD, Normal thyroid  CHEST/LUNG: Clear to auscultaion  CVS: Regular rate and rhythm; No murmurs, rubs, or gallops  GI: : Soft, Nontender, Nondistended; Bowel sounds present  NERVOUS SYSTEM:  Alert & Oriented X3  EXTREMITIES:  2+ Peripheral Pulses, No clubbing, cyanosis, or edema  LYMPH: No lymphadenopathy noted  SKIN: No rashes or lesions  ENDOCRINOLOGY: No Thyromegaly  PSYCH: Appropriate    Labs:  25                            13.0   9.84  )-----------( 318      ( 06 Nov 2024 06:01 )             38.3                         13.4   12.23 )-----------( 336      ( 05 Nov 2024 06:28 )             40.7     11-08    129[L]  |  96  |  18  ----------------------------<  176[H]  4.0   |  18[L]  |  0.81  11-06    133[L]  |  99  |  19  ----------------------------<  101[H]  3.5   |  20[L]  |  0.94  11-05    131[L]  |  96  |  23  ----------------------------<  123[H]  3.9   |  19[L]  |  1.02    Ca    9.1      08 Nov 2024 06:27      CAPILLARY BLOOD GLUCOSE              Urinalysis Basic - ( 08 Nov 2024 06:27 )    Color: x / Appearance: x / SG: x / pH: x  Gluc: 176 mg/dL / Ketone: x  / Bili: x / Urobili: x   Blood: x / Protein: x / Nitrite: x   Leuk Esterase: x / RBC: x / WBC x   Sq Epi: x / Non Sq Epi: x / Bacteria: x      Lactate, Blood: 2.0 mmol/L (11-04 @ 16:30)        RECENT CULTURES:  11-04 @ 14:43 Clean Catch Clean Catch (Midstream)                >=3 organisms. Probable collection contamination.      rad< from: CT Chest No Cont (11.06.24 @ 13:48) >    ACC: [id number] EXAM:  CT CHEST   ORDERED BY: GERRY ROBLES     PROCEDURE DATE:  11/06/2024          INTERPRETATION:  INDICATION: Shortness of breath, dyspnea on exertion    TECHNIQUE: Helical acquisition images of the chest without intravenous   contrast. Maximum intensity projection images were generated.    COMPARISON: None.    FINDINGS:    LUNGS/AIRWAYS/PLEURA: Mild bronchiectasis in the right middle lobe.   Mucous impacted distal bronchi in the right upper and lower lobes.    Subpleural scarring at the apices and superior segment of the left lower   lobe.    LYMPH NODES/MEDIASTINUM: No lymphadenopathy.    HEART/VASCULATURE: Normal sized heart and aorta. No pericardial effusion.   Coronary artery calcifications. Calcified aorta.    UPPER ABDOMEN: Calcification in the spleen which may be from old   granulomatous disease.    BONES/SOFT TISSUES: Unremarkable.      IMPRESSION:    Mild bronchiectasis in the right middle lobe.    No pneumonia.    --- End of Report ---            MARGARITA REYES M.D., ATTENDING RADIOLOGIST  This document has been electronically signed. Nov 6 2024  1:55PM    < end of copied text >      RESPIRATORY CULTURES:          Studies  Chest X-RAY  CT SCAN Chest   Venous Dopplers: LE:   CT Abdomen  Others          echo< from: TTE W or WO Ultrasound Enhancing Agent (11.05.24 @ 14:18) >  ____________________________________________________________________________________     CONCLUSIONS:      1. Technically difficult image quality.   2. Left ventricular cavity isnormal in size. Left ventricular wall thickness is normal. Left ventricular systolic function is normal with an ejection fraction of 62 % by Harrison's method of disks. There are no regional wall motion abnormalities seen.   3. The right ventricle is not well visualized. probably normal right ventricular systolic function.   4. Moderate tricuspid regurgitation.   5. Trace pericardial effusion.   6. No prior echocardiogram is available for comparison.    < end of copied text >      
Patient is a 79y old  Female who presents with a chief complaint of JIMENES (10 Nov 2024 14:00)    Date of servie : 11-10-24 @ 14:52  INTERVAL HPI/OVERNIGHT EVENTS:  T(C): 36.4 (11-10-24 @ 11:30), Max: 36.9 (11-10-24 @ 04:48)  HR: 83 (11-10-24 @ 11:30) (81 - 90)  BP: 120/68 (11-10-24 @ 11:30) (113/60 - 126/67)  RR: 18 (11-10-24 @ 11:30) (18 - 18)  SpO2: 97% (11-10-24 @ 11:30) (97% - 98%)  Wt(kg): --  I&O's Summary    09 Nov 2024 07:01  -  10 Nov 2024 07:00  --------------------------------------------------------  IN: 0 mL / OUT: 800 mL / NET: -800 mL    10 Nov 2024 07:01  -  10 Nov 2024 14:52  --------------------------------------------------------  IN: 480 mL / OUT: 0 mL / NET: 480 mL        LABS:                        11.7   15.72 )-----------( 276      ( 10 Nov 2024 06:34 )             33.5     11-10    129[L]  |  98  |  23  ----------------------------<  156[H]  3.9   |  19[L]  |  0.88    Ca    9.0      10 Nov 2024 06:34  Phos  2.5     11-10  Mg     2.0     11-10        Urinalysis Basic - ( 10 Nov 2024 06:34 )    Color: x / Appearance: x / SG: x / pH: x  Gluc: 156 mg/dL / Ketone: x  / Bili: x / Urobili: x   Blood: x / Protein: x / Nitrite: x   Leuk Esterase: x / RBC: x / WBC x   Sq Epi: x / Non Sq Epi: x / Bacteria: x      CAPILLARY BLOOD GLUCOSE            Urinalysis Basic - ( 10 Nov 2024 06:34 )    Color: x / Appearance: x / SG: x / pH: x  Gluc: 156 mg/dL / Ketone: x  / Bili: x / Urobili: x   Blood: x / Protein: x / Nitrite: x   Leuk Esterase: x / RBC: x / WBC x   Sq Epi: x / Non Sq Epi: x / Bacteria: x        MEDICATIONS  (STANDING):  albuterol/ipratropium for Nebulization 3 milliLiter(s) Nebulizer every 6 hours  apixaban 2.5 milliGRAM(s) Oral every 12 hours  atorvastatin 40 milliGRAM(s) Oral at bedtime  clopidogrel Tablet 75 milliGRAM(s) Oral daily  digoxin     Tablet 125 MICROGram(s) Oral daily  diltiazem    Tablet 60 milliGRAM(s) Oral four times a day  donepezil 10 milliGRAM(s) Oral at bedtime  fluticasone propionate/ salmeterol 250-50 MICROgram(s) Diskus 1 Dose(s) Inhalation two times a day  methylPREDNISolone sodium succinate Injectable 20 milliGRAM(s) IV Push every 12 hours  montelukast 10 milliGRAM(s) Oral daily  sucralfate 1 Gram(s) Oral four times a day    MEDICATIONS  (PRN):  acetaminophen     Tablet .. 650 milliGRAM(s) Oral every 6 hours PRN Mild Pain (1 - 3)          PHYSICAL EXAM:  GENERAL: NAD, well-groomed, well-developed  HEAD:  Atraumatic, Normocephalic  CHEST/LUNG: Clear to percussion bilaterally; No rales, rhonchi, wheezing, or rubs  HEART: Regular rate and rhythm; No murmurs, rubs, or gallops  ABDOMEN: Soft, Nontender, Nondistended; Bowel sounds present  EXTREMITIES:  2+ Peripheral Pulses, No clubbing, cyanosis, or edema  LYMPH: No lymphadenopathy noted  SKIN: No rashes or lesions    Care Discussed with Consultants/Other Providers [ ] YES  [ ] NO
    Subjective: Patient seen and examined. No new events except as noted.     SUBJECTIVE/ROS:  No chest pain, dyspnea, palpitation, or dizziness.       MEDICATIONS:  MEDICATIONS  (STANDING):  albuterol/ipratropium for Nebulization 3 milliLiter(s) Nebulizer every 6 hours  apixaban 2.5 milliGRAM(s) Oral every 12 hours  atorvastatin 40 milliGRAM(s) Oral at bedtime  clopidogrel Tablet 75 milliGRAM(s) Oral daily  diltiazem    Tablet 60 milliGRAM(s) Oral four times a day  donepezil 10 milliGRAM(s) Oral at bedtime  fluticasone propionate/ salmeterol 250-50 MICROgram(s) Diskus 1 Dose(s) Inhalation two times a day  montelukast 10 milliGRAM(s) Oral daily  sucralfate 1 Gram(s) Oral four times a day      PHYSICAL EXAM:  T(C): 37.1 (11-07-24 @ 04:54), Max: 37.1 (11-07-24 @ 04:54)  HR: 89 (11-07-24 @ 04:54) (72 - 103)  BP: 107/61 (11-07-24 @ 04:54) (107/61 - 131/69)  RR: 18 (11-07-24 @ 04:54) (18 - 18)  SpO2: 98% (11-07-24 @ 04:54) (96% - 99%)  Wt(kg): --  I&O's Summary    06 Nov 2024 07:01  -  07 Nov 2024 07:00  --------------------------------------------------------  IN: 480 mL / OUT: 650 mL / NET: -170 mL            JVP: Normal  Neck: supple  Lung: clear   CV: S1 S2 , Murmur:  Abd: soft  Ext: No edema  neuro: Awake / alert  Psych: flat affect  Skin: normal``    LABS/DATA:    CARDIAC MARKERS:                                13.0   9.84  )-----------( 318      ( 06 Nov 2024 06:01 )             38.3     11-06    133[L]  |  99  |  19  ----------------------------<  101[H]  3.5   |  20[L]  |  0.94    Ca    9.2      06 Nov 2024 06:02      proBNP:   Lipid Profile:   HgA1c:   TSH:     TELE:  EKG:        
Date of Service: 11-10-24 @ 14:00    Patient is a 79y old  Female who presents with a chief complaint of JIMENES (10 Nov 2024 06:49)      Any change in ROS: She is feeling slightly better:   on room air      MEDICATIONS  (STANDING):  albuterol/ipratropium for Nebulization 3 milliLiter(s) Nebulizer every 6 hours  apixaban 2.5 milliGRAM(s) Oral every 12 hours  atorvastatin 40 milliGRAM(s) Oral at bedtime  clopidogrel Tablet 75 milliGRAM(s) Oral daily  digoxin     Tablet 125 MICROGram(s) Oral daily  diltiazem    Tablet 60 milliGRAM(s) Oral four times a day  donepezil 10 milliGRAM(s) Oral at bedtime  fluticasone propionate/ salmeterol 250-50 MICROgram(s) Diskus 1 Dose(s) Inhalation two times a day  methylPREDNISolone sodium succinate Injectable 20 milliGRAM(s) IV Push every 12 hours  montelukast 10 milliGRAM(s) Oral daily  sucralfate 1 Gram(s) Oral four times a day    MEDICATIONS  (PRN):  acetaminophen     Tablet .. 650 milliGRAM(s) Oral every 6 hours PRN Mild Pain (1 - 3)    Vital Signs Last 24 Hrs  T(C): 36.4 (10 Nov 2024 11:30), Max: 36.9 (10 Nov 2024 04:48)  T(F): 97.6 (10 Nov 2024 11:30), Max: 98.5 (10 Nov 2024 04:48)  HR: 83 (10 Nov 2024 11:30) (81 - 90)  BP: 120/68 (10 Nov 2024 11:30) (113/60 - 126/67)  BP(mean): --  RR: 18 (10 Nov 2024 11:30) (18 - 18)  SpO2: 97% (10 Nov 2024 11:30) (97% - 98%)    Parameters below as of 10 Nov 2024 11:30  Patient On (Oxygen Delivery Method): room air        I&O's Summary    09 Nov 2024 07:01  -  10 Nov 2024 07:00  --------------------------------------------------------  IN: 0 mL / OUT: 800 mL / NET: -800 mL    10 Nov 2024 07:01  -  10 Nov 2024 14:00  --------------------------------------------------------  IN: 480 mL / OUT: 0 mL / NET: 480 mL          Physical Exam:   GENERAL: NAD, well-groomed, well-developed  HEENT: ANTHONY/   Atraumatic, Normocephalic  ENMT: No tonsillar erythema, exudates, or enlargement; Moist mucous membranes, Good dentition, No lesions  NECK: Supple, No JVD, Normal thyroid  CHEST/LUNG: Clear to auscultaion- no wheezing  CVS: Regular rate and rhythm; No murmurs, rubs, or gallops  GI: : Soft, Nontender, Nondistended; Bowel sounds present  NERVOUS SYSTEM:  Alert & Oriented X3  EXTREMITIES:  2+ Peripheral Pulses, No clubbing, cyanosis, or edema  LYMPH: No lymphadenopathy noted  SKIN: No rashes or lesions  ENDOCRINOLOGY: No Thyromegaly  PSYCH: Appropriate    Labs:  25                            11.7   15.72 )-----------( 276      ( 10 Nov 2024 06:34 )             33.5     11-10    129[L]  |  98  |  23  ----------------------------<  156[H]  3.9   |  19[L]  |  0.88  11-09    130[L]  |  98  |  20  ----------------------------<  166[H]  4.0   |  19[L]  |  0.96  11-08    129[L]  |  96  |  18  ----------------------------<  176[H]  4.0   |  18[L]  |  0.81    Ca    9.0      10 Nov 2024 06:34  Ca    9.6      09 Nov 2024 06:43  Phos  2.5     11-10  Phos  2.7     11-09  Mg     2.0     11-10  Mg     2.1     11-09      CAPILLARY BLOOD GLUCOSE              Urinalysis Basic - ( 10 Nov 2024 06:34 )    Color: x / Appearance: x / SG: x / pH: x  Gluc: 156 mg/dL / Ketone: x  / Bili: x / Urobili: x   Blood: x / Protein: x / Nitrite: x   Leuk Esterase: x / RBC: x / WBC x   Sq Epi: x / Non Sq Epi: x / Bacteria: x            RECENT CULTURES:  11-04 @ 14:43 Clean Catch Clean Catch (Midstream)         RAD< from: CT Chest No Cont (11.06.24 @ 13:48) >    PROCEDURE DATE:  11/06/2024          INTERPRETATION:  INDICATION: Shortness of breath, dyspnea on exertion    TECHNIQUE: Helical acquisition images of the chest without intravenous   contrast. Maximum intensity projection images were generated.    COMPARISON: None.    FINDINGS:    LUNGS/AIRWAYS/PLEURA: Mild bronchiectasis in the right middle lobe.   Mucous impacted distal bronchi in the right upper and lower lobes.    Subpleural scarring at the apices and superior segment of the left lower   lobe.    LYMPH NODES/MEDIASTINUM: No lymphadenopathy.    HEART/VASCULATURE: Normal sized heart and aorta. No pericardial effusion.   Coronary artery calcifications. Calcified aorta.    UPPER ABDOMEN: Calcification in the spleen which may be from old   granulomatous disease.    BONES/SOFT TISSUES: Unremarkable.      IMPRESSION:    Mild bronchiectasis in the right middle lobe.    No pneumonia.    --- End of Report ---            MARGARITA REYES M.D., ATTENDING RADIOLOGIST  This document has been electronically signed. Nov 6 2024  1:55PM    < end of copied text >         >=3 organisms. Probable collection contamination.          RESPIRATORY CULTURES:          Studies  Chest X-RAY  CT SCAN Chest   Venous Dopplers: LE:   CT Abdomen  Others              
Patient is a 79y old  Female who presents with a chief complaint of JIMENES (07 Nov 2024 15:51)    Date of servie : 11-07-24 @ 16:11  INTERVAL HPI/OVERNIGHT EVENTS:  T(C): 36.4 (11-07-24 @ 11:43), Max: 37.1 (11-07-24 @ 04:54)  HR: 79 (11-07-24 @ 14:50) (77 - 96)  BP: 104/63 (11-07-24 @ 14:50) (104/63 - 121/62)  RR: 18 (11-07-24 @ 11:43) (18 - 18)  SpO2: 98% (11-07-24 @ 14:50) (97% - 99%)  Wt(kg): --  I&O's Summary    06 Nov 2024 07:01  -  07 Nov 2024 07:00  --------------------------------------------------------  IN: 480 mL / OUT: 650 mL / NET: -170 mL    07 Nov 2024 07:01  -  07 Nov 2024 16:11  --------------------------------------------------------  IN: 480 mL / OUT: 200 mL / NET: 280 mL        LABS:                        13.0   9.84  )-----------( 318      ( 06 Nov 2024 06:01 )             38.3     11-06    133[L]  |  99  |  19  ----------------------------<  101[H]  3.5   |  20[L]  |  0.94    Ca    9.2      06 Nov 2024 06:02        Urinalysis Basic - ( 06 Nov 2024 06:02 )    Color: x / Appearance: x / SG: x / pH: x  Gluc: 101 mg/dL / Ketone: x  / Bili: x / Urobili: x   Blood: x / Protein: x / Nitrite: x   Leuk Esterase: x / RBC: x / WBC x   Sq Epi: x / Non Sq Epi: x / Bacteria: x      CAPILLARY BLOOD GLUCOSE            Urinalysis Basic - ( 06 Nov 2024 06:02 )    Color: x / Appearance: x / SG: x / pH: x  Gluc: 101 mg/dL / Ketone: x  / Bili: x / Urobili: x   Blood: x / Protein: x / Nitrite: x   Leuk Esterase: x / RBC: x / WBC x   Sq Epi: x / Non Sq Epi: x / Bacteria: x        MEDICATIONS  (STANDING):  albuterol/ipratropium for Nebulization 3 milliLiter(s) Nebulizer every 6 hours  apixaban 2.5 milliGRAM(s) Oral every 12 hours  atorvastatin 40 milliGRAM(s) Oral at bedtime  clopidogrel Tablet 75 milliGRAM(s) Oral daily  diltiazem    Tablet 60 milliGRAM(s) Oral four times a day  donepezil 10 milliGRAM(s) Oral at bedtime  fluticasone propionate/ salmeterol 250-50 MICROgram(s) Diskus 1 Dose(s) Inhalation two times a day  methylPREDNISolone sodium succinate Injectable 40 milliGRAM(s) IV Push every 8 hours  montelukast 10 milliGRAM(s) Oral daily  sucralfate 1 Gram(s) Oral four times a day    MEDICATIONS  (PRN):  acetaminophen     Tablet .. 650 milliGRAM(s) Oral every 6 hours PRN Mild Pain (1 - 3)          PHYSICAL EXAM:  GENERAL: NAD, well-groomed, well-developed  HEAD:  Atraumatic, Normocephalic  CHEST/LUNG: Clear to percussion bilaterally; No rales, rhonchi, wheezing, or rubs  HEART: Regular rate and rhythm; No murmurs, rubs, or gallops  ABDOMEN: Soft, Nontender, Nondistended; Bowel sounds present  EXTREMITIES:  2+ Peripheral Pulses, No clubbing, cyanosis, or edema  LYMPH: No lymphadenopathy noted  SKIN: No rashes or lesions    Care Discussed with Consultants/Other Providers [x ] YES  [ ] NO

## 2024-11-12 NOTE — DISCHARGE NOTE NURSING/CASE MANAGEMENT/SOCIAL WORK - FINANCIAL ASSISTANCE
[facility] provides services at a reduced cost to those who are determined to be eligible through [facility]’s financial assistance program. Information regarding [facility]’s financial assistance program can be found by going to [email or web address] or by calling [phone].

## 2024-11-12 NOTE — DISCHARGE NOTE NURSING/CASE MANAGEMENT/SOCIAL WORK - PATIENT PORTAL LINK FT
You can access the FollowMyHealth Patient Portal offered by [facility] by registering at the following website: [email or web address]. By joining [employer]’s FollowMyHealth portal, you will also be able to view your health information using other applications (apps) compatible with our system.

## 2024-11-26 PROCEDURE — 83690 ASSAY OF LIPASE: CPT

## 2024-11-26 PROCEDURE — 80048 BASIC METABOLIC PNL TOTAL CA: CPT

## 2024-11-26 PROCEDURE — 71045 X-RAY EXAM CHEST 1 VIEW: CPT

## 2024-11-26 PROCEDURE — 83880 ASSAY OF NATRIURETIC PEPTIDE: CPT

## 2024-11-26 PROCEDURE — 81001 URINALYSIS AUTO W/SCOPE: CPT

## 2024-11-26 PROCEDURE — 97110 THERAPEUTIC EXERCISES: CPT

## 2024-11-26 PROCEDURE — 82330 ASSAY OF CALCIUM: CPT

## 2024-11-26 PROCEDURE — 93308 TTE F-UP OR LMTD: CPT

## 2024-11-26 PROCEDURE — 80053 COMPREHEN METABOLIC PANEL: CPT

## 2024-11-26 PROCEDURE — 84484 ASSAY OF TROPONIN QUANT: CPT

## 2024-11-26 PROCEDURE — 85018 HEMOGLOBIN: CPT

## 2024-11-26 PROCEDURE — A9500: CPT

## 2024-11-26 PROCEDURE — 97530 THERAPEUTIC ACTIVITIES: CPT

## 2024-11-26 PROCEDURE — 86304 IMMUNOASSAY TUMOR CA 125: CPT

## 2024-11-26 PROCEDURE — 78452 HT MUSCLE IMAGE SPECT MULT: CPT | Mod: MC

## 2024-11-26 PROCEDURE — C8929: CPT

## 2024-11-26 PROCEDURE — 86301 IMMUNOASSAY TUMOR CA 19-9: CPT

## 2024-11-26 PROCEDURE — 93017 CV STRESS TEST TRACING ONLY: CPT

## 2024-11-26 PROCEDURE — 84100 ASSAY OF PHOSPHORUS: CPT

## 2024-11-26 PROCEDURE — 97161 PT EVAL LOW COMPLEX 20 MIN: CPT

## 2024-11-26 PROCEDURE — 83605 ASSAY OF LACTIC ACID: CPT

## 2024-11-26 PROCEDURE — 82947 ASSAY GLUCOSE BLOOD QUANT: CPT

## 2024-11-26 PROCEDURE — 71250 CT THORAX DX C-: CPT | Mod: MC

## 2024-11-26 PROCEDURE — 87086 URINE CULTURE/COLONY COUNT: CPT

## 2024-11-26 PROCEDURE — 85025 COMPLETE CBC W/AUTO DIFF WBC: CPT

## 2024-11-26 PROCEDURE — 36415 COLL VENOUS BLD VENIPUNCTURE: CPT

## 2024-11-26 PROCEDURE — 82435 ASSAY OF BLOOD CHLORIDE: CPT

## 2024-11-26 PROCEDURE — 84132 ASSAY OF SERUM POTASSIUM: CPT

## 2024-11-26 PROCEDURE — 85027 COMPLETE CBC AUTOMATED: CPT

## 2024-11-26 PROCEDURE — 99285 EMERGENCY DEPT VISIT HI MDM: CPT

## 2024-11-26 PROCEDURE — 94640 AIRWAY INHALATION TREATMENT: CPT

## 2024-11-26 PROCEDURE — 85014 HEMATOCRIT: CPT

## 2024-11-26 PROCEDURE — 80162 ASSAY OF DIGOXIN TOTAL: CPT

## 2024-11-26 PROCEDURE — 93005 ELECTROCARDIOGRAM TRACING: CPT

## 2024-11-26 PROCEDURE — 84295 ASSAY OF SERUM SODIUM: CPT

## 2024-11-26 PROCEDURE — 82803 BLOOD GASES ANY COMBINATION: CPT

## 2024-11-26 PROCEDURE — 83735 ASSAY OF MAGNESIUM: CPT

## 2025-07-07 NOTE — CONSULT NOTE ADULT - REASON FOR ADMISSION
"REASON FOR CONVERSATION: Breast Pain    SYMPTOMS: right breast pain-  \"Burning, tension, tight feeling\" with tight bra removal- varies in intensity (tender)    OTHER HEALTH INFORMATION: Reports symptoms for 18 days.  Started before her period, but have continued.    Denies fevers, lumps, swelling, nipple changes.    PROTOCOL DISPOSITION: Discuss with Provider and Call Back Patient    CARE ADVICE PROVIDED: continue home care- bra for support, tylenol/ibuprofen for pain  Call with new/worsening symptoms    PRACTICE FOLLOW-UP: message to provider for recommendations        Answer Assessment - Initial Assessment Questions  1. SYMPTOM: \"What's the main symptom you're concerned about?\"  (e.g., lump, nipple discharge, pain, rash )      \"Burning, tension, tight feeling\" with tight bra removal- varies in intensity (tender)  2. LOCATION: \"Where is the symptoms located?\"      Right breast   3. ONSET: \"When did symptoms  start?\"      18 days ago  4. PRIOR HISTORY: \"Do you have any history of prior problems with your breasts?\" (e.g., breast cancer, breast implant, fibrocystic breast disease)      Has had pain before- everything normal  5. CAUSE: \"What do you think is causing this symptom?\"      Unsure, ?hormonal  6. OTHER SYMPTOMS: \"Do you have any other symptoms?\" (e.g., fever, breast pain, nipple discharge, redness or rash)      denies  7. PREGNANCY-BREASTFEEDING: \"Is there any chance you are pregnant?\" \"When was your last menstrual period?\" \"Are you breastfeeding?\"      no    Protocols used: Breast Symptoms-Adult-[place]    "
Called patient, scheduled her for office visit for eval per providers recommendations.  
JIMENES
Regarding: Breast pain R  ----- Message from Quyen DE JESUS sent at 7/7/2025  8:42 AM EDT -----    Patient was In less than a month ago for her annual. Didn't have the pain at the time but currently has Right breast pain new onset. Puts on a bra it burns.    
JIMENES

## (undated) DEVICE — GLV 7.5 PROTEXIS (WHITE)

## (undated) DEVICE — KIT ENDO PROCEDURE CUST W/VLV

## (undated) DEVICE — BITE BLOCK ADULT 20 X 27MM (GREEN)

## (undated) DEVICE — FORCEP RADIAL JAW 4 W NDL 2.4MM 2.8MM 240CM ORANGE DISP

## (undated) DEVICE — TUBING HYBRID CO2